# Patient Record
Sex: MALE | Race: OTHER | NOT HISPANIC OR LATINO | URBAN - METROPOLITAN AREA
[De-identification: names, ages, dates, MRNs, and addresses within clinical notes are randomized per-mention and may not be internally consistent; named-entity substitution may affect disease eponyms.]

---

## 2024-11-05 ENCOUNTER — HOSPITAL ENCOUNTER (OUTPATIENT)
Dept: RADIOLOGY | Facility: HOSPITAL | Age: 29
Discharge: HOME | End: 2024-11-05
Attending: INTERNAL MEDICINE
Payer: COMMERCIAL

## 2024-11-05 DIAGNOSIS — M25.559 HIP PAIN: ICD-10-CM

## 2024-11-05 PROCEDURE — 73502 X-RAY EXAM HIP UNI 2-3 VIEWS: CPT | Mod: TC,RT

## 2024-11-06 DIAGNOSIS — M25.551 RIGHT HIP PAIN: Primary | ICD-10-CM

## 2024-11-20 ENCOUNTER — OFFICE VISIT (OUTPATIENT)
Dept: ORTHOPEDICS | Facility: CLINIC | Age: 29
End: 2024-11-20
Payer: COMMERCIAL

## 2024-11-20 VITALS
WEIGHT: 198.81 LBS | HEIGHT: 69 IN | DIASTOLIC BLOOD PRESSURE: 86 MMHG | BODY MASS INDEX: 29.45 KG/M2 | HEART RATE: 92 BPM | SYSTOLIC BLOOD PRESSURE: 138 MMHG

## 2024-11-20 DIAGNOSIS — M25.551 RIGHT HIP PAIN: ICD-10-CM

## 2024-11-20 DIAGNOSIS — M16.11 PRIMARY OSTEOARTHRITIS OF RIGHT HIP: Primary | ICD-10-CM

## 2024-11-20 DIAGNOSIS — M25.551 RIGHT HIP PAIN: Primary | ICD-10-CM

## 2024-11-20 DIAGNOSIS — M45.9 ANKYLOSING SPONDYLITIS, UNSPECIFIED SITE OF SPINE: ICD-10-CM

## 2024-11-20 PROCEDURE — 3075F SYST BP GE 130 - 139MM HG: CPT | Mod: CPTII,,, | Performed by: PHYSICIAN ASSISTANT

## 2024-11-20 PROCEDURE — 3079F DIAST BP 80-89 MM HG: CPT | Mod: CPTII,,, | Performed by: PHYSICIAN ASSISTANT

## 2024-11-20 PROCEDURE — 4010F ACE/ARB THERAPY RXD/TAKEN: CPT | Mod: CPTII,,, | Performed by: PHYSICIAN ASSISTANT

## 2024-11-20 PROCEDURE — 1160F RVW MEDS BY RX/DR IN RCRD: CPT | Mod: CPTII,,, | Performed by: PHYSICIAN ASSISTANT

## 2024-11-20 PROCEDURE — 99203 OFFICE O/P NEW LOW 30 MIN: CPT | Mod: ,,, | Performed by: PHYSICIAN ASSISTANT

## 2024-11-20 PROCEDURE — 1159F MED LIST DOCD IN RCRD: CPT | Mod: CPTII,,, | Performed by: PHYSICIAN ASSISTANT

## 2024-11-20 PROCEDURE — 3008F BODY MASS INDEX DOCD: CPT | Mod: CPTII,,, | Performed by: PHYSICIAN ASSISTANT

## 2024-11-20 RX ORDER — METHOCARBAMOL 750 MG/1
500 TABLET, FILM COATED ORAL 4 TIMES DAILY
COMMUNITY

## 2024-11-20 RX ORDER — CELECOXIB 200 MG/1
CAPSULE ORAL
COMMUNITY
Start: 2024-10-15

## 2024-11-20 NOTE — PROGRESS NOTES
"Chief Complaint:   Chief Complaint   Patient presents with    Hip Pain     R hip pain ongoing for about 6 mths. States is has gradually been getting worse. At times pain will radiate in the groin lower back and down the leg.        History of present illness:    This is a 29 y.o. year old male who complains of right hip and groin pain.    Patient states he has been having pain for about 6 months but it was gradually been getting worse   He states that he when he was back in [facility] of the-he was diagnosed with ankylosing spondylitis in 2010 he believes.    The medications he has been using recently have not been helping with his symptoms in his here for increasing right hip pain.          Current Outpatient Medications   Medication Sig    celecoxib (CELEBREX) 200 MG capsule     methocarbamoL (ROBAXIN) 750 MG Tab Take 500 mg by mouth 4 (four) times daily. (Patient not taking: Reported on 11/20/2024)     No current facility-administered medications for this visit.       Review of Systems:    Constitution:   Denies chills, fever, and sweats.  HENT:   Denies headaches or blurry vision.  Cardiovascular:  Denies chest pain or irregular heart beat.  Respiratory:   Denies cough or shortness of breath.  Gastrointestinal:  Denies abdominal pain, nausea, or vomiting.  Musculoskeletal:   Denies muscle cramps.  Neurological:   Denies dizziness or focal weakness.  Psychiatric/Behavior: Normal mental status.  Hematology/Lymph:  Denies bleeding problem or easy bruising/bleeding.  Skin:    Denies rash or suspicious lesions.    Examination:    Vital Signs:    Vitals:    11/20/24 0947   BP: 138/86   Pulse: 92   Weight: 90.2 kg (198 lb 12.8 oz)   Height: 5' 9" (1.753 m)       Body mass index is 29.36 kg/m².    Constitution:   Well-developed, well nourished patient in no acute distress.  Neurological:   Alert and oriented x 3 and cooperative to examination.     Psychiatric/Behavior: Normal mental status.  Respiratory:   No shortness of " breath.  Eyes:    Extraoccular muscles intact  Skin:    No scars, rash or suspicious lesions.    Physical Exam:   Hip Exam:  Right  No obvious deformity.   Flexion to 100 degrees and internal and external rotation to 10 degrees.   Abduction to 15 degree and adduction to 10 degrees.   Positive NELSON test.   Positive Stinchfield test.   No flexion contracture.   Negative  greater trochanteric tenderness.   Negative ANNETTA test.   2/5 strength, normal skin appearance and palpable pulses distally. Sensibility normal.    Imaging: X-rays reviewed from the PCP's office  INDINGS:  No acute displaced fractures or dislocations.     There is narrowing of the inferior medial and superolateral aspect of both hip joints with degenerative changes of the sacroiliac joints and lumbosacral spine articular spaces are otherwise preserved with smooth articular surfaces     No blastic or lytic lesions.     Soft tissues within normal limits.     Impression:     Degenerative changes.       Assessment: Primary osteoarthritis of right hip    Right hip pain  -     Ambulatory referral/consult to Orthopedics    Ankylosing spondylitis, unspecified site of spine         Plan:  The patient does have some significant degenerative changes of his right hip and limitations of range of motion.    Also reviewed his lumbar x-rays which he does have ankylosing spondylitis and his symptoms do appear to be coming more from his hip in his back presently.    We will have him see 1 of our total joint specialist to discuss if injection would be an option for him to buy us some time but he will more than likely need a total hip arthroplasty some point in the future pain   Patient was follow up with Dr. Emily medrano for more definitive treatment options          DISCLAIMER: This note may have been dictated using voice recognition software and may contain grammatical errors.     NOTE: Consult report sent to referring provider via [employer].

## 2024-12-19 ENCOUNTER — OFFICE VISIT (OUTPATIENT)
Dept: ORTHOPEDICS | Facility: CLINIC | Age: 29
End: 2024-12-19
Payer: COMMERCIAL

## 2024-12-19 ENCOUNTER — LAB VISIT (OUTPATIENT)
Dept: LAB | Facility: HOSPITAL | Age: 29
End: 2024-12-19
Attending: ORTHOPAEDIC SURGERY
Payer: COMMERCIAL

## 2024-12-19 VITALS
DIASTOLIC BLOOD PRESSURE: 104 MMHG | HEART RATE: 101 BPM | SYSTOLIC BLOOD PRESSURE: 146 MMHG | BODY MASS INDEX: 28.73 KG/M2 | WEIGHT: 194 LBS | HEIGHT: 69 IN

## 2024-12-19 DIAGNOSIS — M45.9 ANKYLOSING SPONDYLITIS, UNSPECIFIED SITE OF SPINE: Primary | ICD-10-CM

## 2024-12-19 DIAGNOSIS — M45.9 ANKYLOSING SPONDYLITIS, UNSPECIFIED SITE OF SPINE: ICD-10-CM

## 2024-12-19 DIAGNOSIS — M16.11 PRIMARY OSTEOARTHRITIS OF RIGHT HIP: ICD-10-CM

## 2024-12-19 LAB
BASOPHILS # BLD AUTO: 0.03 X10(3)/MCL
BASOPHILS NFR BLD AUTO: 0.4 %
CRP SERPL-MCNC: 18.8 MG/L
EOSINOPHIL # BLD AUTO: 0.09 X10(3)/MCL (ref 0–0.9)
EOSINOPHIL NFR BLD AUTO: 1.2 %
ERYTHROCYTE [DISTWIDTH] IN BLOOD BY AUTOMATED COUNT: 12.8 % (ref 11.5–17)
ERYTHROCYTE [SEDIMENTATION RATE] IN BLOOD: 27 MM/HR (ref 0–15)
HCT VFR BLD AUTO: 46.7 % (ref 42–52)
HGB BLD-MCNC: 15.6 G/DL (ref 14–18)
IMM GRANULOCYTES # BLD AUTO: 0.02 X10(3)/MCL (ref 0–0.04)
IMM GRANULOCYTES NFR BLD AUTO: 0.3 %
LYMPHOCYTES # BLD AUTO: 2.06 X10(3)/MCL (ref 0.6–4.6)
LYMPHOCYTES NFR BLD AUTO: 28.3 %
MCH RBC QN AUTO: 27.3 PG (ref 27–31)
MCHC RBC AUTO-ENTMCNC: 33.4 G/DL (ref 33–36)
MCV RBC AUTO: 81.6 FL (ref 80–94)
MONOCYTES # BLD AUTO: 0.44 X10(3)/MCL (ref 0.1–1.3)
MONOCYTES NFR BLD AUTO: 6 %
NEUTROPHILS # BLD AUTO: 4.65 X10(3)/MCL (ref 2.1–9.2)
NEUTROPHILS NFR BLD AUTO: 63.8 %
NRBC BLD AUTO-RTO: 0 %
PLATELET # BLD AUTO: 252 X10(3)/MCL (ref 130–400)
PMV BLD AUTO: 10.9 FL (ref 7.4–10.4)
RBC # BLD AUTO: 5.72 X10(6)/MCL (ref 4.7–6.1)
RHEUMATOID FACT SERPL-ACNC: <13 IU/ML
WBC # BLD AUTO: 7.29 X10(3)/MCL (ref 4.5–11.5)

## 2024-12-19 PROCEDURE — 86140 C-REACTIVE PROTEIN: CPT

## 2024-12-19 PROCEDURE — 86225 DNA ANTIBODY NATIVE: CPT

## 2024-12-19 PROCEDURE — 85652 RBC SED RATE AUTOMATED: CPT

## 2024-12-19 PROCEDURE — 86812 HLA TYPING A B OR C: CPT

## 2024-12-19 PROCEDURE — 85025 COMPLETE CBC W/AUTO DIFF WBC: CPT

## 2024-12-19 PROCEDURE — 86200 CCP ANTIBODY: CPT

## 2024-12-19 PROCEDURE — 36415 COLL VENOUS BLD VENIPUNCTURE: CPT

## 2024-12-19 PROCEDURE — 86431 RHEUMATOID FACTOR QUANT: CPT

## 2024-12-19 RX ORDER — SODIUM CHLORIDE 9 MG/ML
INJECTION, SOLUTION INTRAVENOUS CONTINUOUS
OUTPATIENT
Start: 2024-12-19

## 2024-12-19 NOTE — H&P (VIEW-ONLY)
Chief Complaint:   Chief Complaint   Patient presents with    Right Hip - Pain     Right hip pain also his back radiating to glutens and down to knee.ongoing about 7-8 months he had this beofre but it got better and its not going away. He has trouble getting up from sitting and getting out of a car. He is taking celebrex to help with pain at night. Stiffness and has a limp the pain changes.  Had a fall from a elevator and he landed on a brick about about 3 or 4 feet high and his leg was twisted.       History of present illness:    History of Present Illness  The patient is a 29-year-old male who presents for evaluation of right hip pain.    He has been experiencing intermittent radiating pain in his right hip for the past 8 months, with occasional discomfort extending to his back, upper thigh, and groin area. The pain is described as more of a stiffness. He reports no cardiac or pulmonary issues but acknowledges mild pain in his right leg. He also mentions a previous diagnosis of ankylosing spondylitis in 2010, which rendered him unable to work for over a year. Despite not receiving formal treatment, he managed to recover through gym exercises. However, he still experiences intermittent pain episodes that typically resolve within 3 to 4 days. An episode of pain that started 8 months ago has persisted. He expresses concern about potential weight gain from injections and the risk of heart attack and stroke associated with Celebrex. He prefers to avoid medication and is considering injections as an alternative. He has been managing the pain with nightly doses of Celebrex for the last 2 months. He has not received any injections to date. A previous referral to physical therapy resulted in a 3-week course of treatment, which initially provided relief but was discontinued due to a subsequent increase in pain. He has an upcoming appointment with a rheumatologist on 04/08/2025.    MEDICATIONS  Celecoxib    No past  medical history on file.    No past surgical history on file.    Current Outpatient Medications   Medication Sig    celecoxib (CELEBREX) 200 MG capsule     methocarbamoL (ROBAXIN) 750 MG Tab Take 500 mg by mouth 4 (four) times daily. (Patient not taking: Reported on 11/20/2024)     No current facility-administered medications for this visit.       Review of patient's allergies indicates:  No Known Allergies    Family History   Problem Relation Name Age of Onset    Diabetes Mother      Hypertension Mother      Hypertension Father      Diabetes Father         Social History     Socioeconomic History    Marital status: Unknown   Tobacco Use    Smoking status: Some Days     Types: Cigarettes, Vaping with nicotine    Smokeless tobacco: Never           Review of Systems:    Constitution: Negative for chills, fever, and sweats.  Negative for unexplained weight loss.    HENT:  Negative for headaches and blurry vision.    Cardiovascular:Negative for chest pain or irregular heart beat. Negative for hypertension.    Respiratory:  Negative for cough and shortness of breath.    Gastrointestinal: Negative for abdominal pain, heartburn, melena, nausea, and vomitting.    Genitourinary:  Negative bladder incontinence and dysuria.    Musculoskeletal:  See HPI    Neurological: Negative for numbness.    Psychiatric/Behavioral: Negative for depression.  The patient is not nervous/anxious.      Endocrine: Negative for polyuria    Hematologic/Lymphatic: Negative for bleeding problem.  Does not bruise/bleed easily.    Skin: Negative for poor would healing and rash      Physical Examination:    Vital Signs:    Vitals:    12/19/24 1412   BP: (!) 146/104   Pulse: 101       Body mass index is 28.65 kg/m².    General: No acute distress, alert and oriented, healthy appearing    HEENT: Head is atraumatic, mucous membranes are moist    Neck: Supples, no JVD    Cardiovascular: Palpable dorsalis pedis and posterior tibial pulses, regular rate and  rhythm to those pulses    Lungs: Breathing non-labored    Skin: no rashes appreciated    Neurologic: Can flex and extend knees, ankles, and toes. Sensation is grossly intact    Right hip:  Range of motion of the right hip significant groin pain.  Brisk cap refill disappeared sensation intact distally.    X-rays:  Three views right hip end-stage osteoarthritis bone-on-bone articulation of the hip.  Kellgren Evert 4 changes right hip.     Assessment::  Right hip osteoarthritis    Plan:  Discussed all treatment with the patient.  Patient with end-stage osteoarthritis of the right hip.  He would likely having ankylosing spondylitis.  He would like to work him up for rheumatologic issues today.  Does have rheumatoid appointment coming up.  We will try an injection to calm this down temporarily well they work him up for rheumatologic issues.  Heading to towards hip replacement on the right side.    This note was generated with the assistance of ambient listening technology. Verbal consent was obtained by the patient and accompanying visitor(s) for the recording of patient appointment to facilitate this note. I attest to having reviewed and edited the generated note for accuracy, though some syntax or spelling errors may persist. Please contact the author of this note for any clarification.      This note was created using [employer] voice recognition software that occasionally misinterpreted phrases or words.    Consult note is delivered via Epic messaging service.

## 2024-12-19 NOTE — PROGRESS NOTES
Chief Complaint:   Chief Complaint   Patient presents with    Right Hip - Pain     Right hip pain also his back radiating to glutens and down to knee.ongoing about 7-8 months he had this beofre but it got better and its not going away. He has trouble getting up from sitting and getting out of a car. He is taking celebrex to help with pain at night. Stiffness and has a limp the pain changes.  Had a fall from a elevator and he landed on a brick about about 3 or 4 feet high and his leg was twisted.       History of present illness:    History of Present Illness  The patient is a 29-year-old male who presents for evaluation of right hip pain.    He has been experiencing intermittent radiating pain in his right hip for the past 8 months, with occasional discomfort extending to his back, upper thigh, and groin area. The pain is described as more of a stiffness. He reports no cardiac or pulmonary issues but acknowledges mild pain in his right leg. He also mentions a previous diagnosis of ankylosing spondylitis in 2010, which rendered him unable to work for over a year. Despite not receiving formal treatment, he managed to recover through gym exercises. However, he still experiences intermittent pain episodes that typically resolve within 3 to 4 days. An episode of pain that started 8 months ago has persisted. He expresses concern about potential weight gain from injections and the risk of heart attack and stroke associated with Celebrex. He prefers to avoid medication and is considering injections as an alternative. He has been managing the pain with nightly doses of Celebrex for the last 2 months. He has not received any injections to date. A previous referral to physical therapy resulted in a 3-week course of treatment, which initially provided relief but was discontinued due to a subsequent increase in pain. He has an upcoming appointment with a rheumatologist on 04/08/2025.    MEDICATIONS  Celecoxib    No past  medical history on file.    No past surgical history on file.    Current Outpatient Medications   Medication Sig    celecoxib (CELEBREX) 200 MG capsule     methocarbamoL (ROBAXIN) 750 MG Tab Take 500 mg by mouth 4 (four) times daily. (Patient not taking: Reported on 11/20/2024)     No current facility-administered medications for this visit.       Review of patient's allergies indicates:  No Known Allergies    Family History   Problem Relation Name Age of Onset    Diabetes Mother      Hypertension Mother      Hypertension Father      Diabetes Father         Social History     Socioeconomic History    Marital status: Unknown   Tobacco Use    Smoking status: Some Days     Types: Cigarettes, Vaping with nicotine    Smokeless tobacco: Never           Review of Systems:    Constitution: Negative for chills, fever, and sweats.  Negative for unexplained weight loss.    HENT:  Negative for headaches and blurry vision.    Cardiovascular:Negative for chest pain or irregular heart beat. Negative for hypertension.    Respiratory:  Negative for cough and shortness of breath.    Gastrointestinal: Negative for abdominal pain, heartburn, melena, nausea, and vomitting.    Genitourinary:  Negative bladder incontinence and dysuria.    Musculoskeletal:  See HPI    Neurological: Negative for numbness.    Psychiatric/Behavioral: Negative for depression.  The patient is not nervous/anxious.      Endocrine: Negative for polyuria    Hematologic/Lymphatic: Negative for bleeding problem.  Does not bruise/bleed easily.    Skin: Negative for poor would healing and rash      Physical Examination:    Vital Signs:    Vitals:    12/19/24 1412   BP: (!) 146/104   Pulse: 101       Body mass index is 28.65 kg/m².    General: No acute distress, alert and oriented, healthy appearing    HEENT: Head is atraumatic, mucous membranes are moist    Neck: Supples, no JVD    Cardiovascular: Palpable dorsalis pedis and posterior tibial pulses, regular rate and  rhythm to those pulses    Lungs: Breathing non-labored    Skin: no rashes appreciated    Neurologic: Can flex and extend knees, ankles, and toes. Sensation is grossly intact    Right hip:  Range of motion of the right hip significant groin pain.  Brisk cap refill disappeared sensation intact distally.    X-rays:  Three views right hip end-stage osteoarthritis bone-on-bone articulation of the hip.  Kellgren Evert 4 changes right hip.     Assessment::  Right hip osteoarthritis    Plan:  Discussed all treatment with the patient.  Patient with end-stage osteoarthritis of the right hip.  He would likely having ankylosing spondylitis.  He would like to work him up for rheumatologic issues today.  Does have rheumatoid appointment coming up.  We will try an injection to calm this down temporarily well they work him up for rheumatologic issues.  Heading to towards hip replacement on the right side.    This note was generated with the assistance of ambient listening technology. Verbal consent was obtained by the patient and accompanying visitor(s) for the recording of patient appointment to facilitate this note. I attest to having reviewed and edited the generated note for accuracy, though some syntax or spelling errors may persist. Please contact the author of this note for any clarification.      This note was created using [employer] voice recognition software that occasionally misinterpreted phrases or words.    Consult note is delivered via Epic messaging service.

## 2024-12-20 LAB
ANTINUCLEAR ANTIBODY SCREEN (OHS): NEGATIVE
CYCLIC CITRULLINATED PEPTIDE (CCP) (OHS): 2.1 U/ML
DSDNA AB QUANT (OHS): 0.8 IU/ML

## 2024-12-21 LAB
HLA TYP COMM-IMP: NORMAL
HLA-B27 QL FC: POSITIVE

## 2025-01-07 RX ORDER — GLUCAGON 1 MG
1 KIT INJECTION
OUTPATIENT
Start: 2025-01-07

## 2025-01-07 RX ORDER — ONDANSETRON HYDROCHLORIDE 2 MG/ML
4 INJECTION, SOLUTION INTRAVENOUS ONCE AS NEEDED
OUTPATIENT
Start: 2025-01-07 | End: 2036-06-05

## 2025-01-08 ENCOUNTER — HOSPITAL ENCOUNTER (OUTPATIENT)
Facility: HOSPITAL | Age: 30
Discharge: HOME | End: 2025-01-08
Attending: ORTHOPAEDIC SURGERY | Admitting: ORTHOPAEDIC SURGERY
Payer: COMMERCIAL

## 2025-01-08 ENCOUNTER — TELEPHONE (OUTPATIENT)
Dept: SURGERY | Facility: HOSPITAL | Age: 30
End: 2025-01-08
Payer: COMMERCIAL

## 2025-01-08 DIAGNOSIS — M16.11 PRIMARY OSTEOARTHRITIS OF RIGHT HIP: ICD-10-CM

## 2025-01-08 PROCEDURE — 77002 NEEDLE LOCALIZATION BY XRAY: CPT | Mod: 26,,, | Performed by: ORTHOPAEDIC SURGERY

## 2025-01-08 PROCEDURE — 20610 DRAIN/INJ JOINT/BURSA W/O US: CPT | Performed by: ORTHOPAEDIC SURGERY

## 2025-01-08 PROCEDURE — 20610 DRAIN/INJ JOINT/BURSA W/O US: CPT | Mod: RT,,, | Performed by: ORTHOPAEDIC SURGERY

## 2025-01-08 PROCEDURE — 63600175 PHARM REV CODE 636 W HCPCS: Performed by: ORTHOPAEDIC SURGERY

## 2025-01-08 RX ORDER — BETAMETHASONE SODIUM PHOSPHATE AND BETAMETHASONE ACETATE 3; 3 MG/ML; MG/ML
INJECTION, SUSPENSION INTRA-ARTICULAR; INTRALESIONAL; INTRAMUSCULAR; SOFT TISSUE
Status: DISCONTINUED | OUTPATIENT
Start: 2025-01-08 | End: 2025-01-08 | Stop reason: HOSPADM

## 2025-01-08 RX ORDER — MELOXICAM 15 MG/1
15 TABLET ORAL DAILY
Qty: 30 TABLET | Refills: 2 | Status: SHIPPED | OUTPATIENT
Start: 2025-01-08 | End: 2025-01-08 | Stop reason: SDUPTHER

## 2025-01-08 RX ORDER — SODIUM CHLORIDE 9 MG/ML
INJECTION, SOLUTION INTRAVENOUS CONTINUOUS
Status: DISCONTINUED | OUTPATIENT
Start: 2025-01-08 | End: 2025-01-08 | Stop reason: HOSPADM

## 2025-01-08 RX ORDER — LIDOCAINE HYDROCHLORIDE 10 MG/ML
INJECTION, SOLUTION INFILTRATION; PERINEURAL
Status: DISCONTINUED
Start: 2025-01-08 | End: 2025-01-08 | Stop reason: HOSPADM

## 2025-01-08 RX ORDER — LIDOCAINE HYDROCHLORIDE 10 MG/ML
1 INJECTION, SOLUTION EPIDURAL; INFILTRATION; INTRACAUDAL; PERINEURAL ONCE
Status: DISCONTINUED | OUTPATIENT
Start: 2025-01-08 | End: 2025-01-08 | Stop reason: HOSPADM

## 2025-01-08 RX ORDER — BETAMETHASONE SODIUM PHOSPHATE AND BETAMETHASONE ACETATE 3; 3 MG/ML; MG/ML
INJECTION, SUSPENSION INTRA-ARTICULAR; INTRALESIONAL; INTRAMUSCULAR; SOFT TISSUE
Status: DISCONTINUED
Start: 2025-01-08 | End: 2025-01-08 | Stop reason: HOSPADM

## 2025-01-08 RX ORDER — LIDOCAINE HYDROCHLORIDE 10 MG/ML
INJECTION, SOLUTION INFILTRATION; PERINEURAL
Status: DISCONTINUED | OUTPATIENT
Start: 2025-01-08 | End: 2025-01-08 | Stop reason: HOSPADM

## 2025-01-08 RX ORDER — HYDROCODONE BITARTRATE AND ACETAMINOPHEN 5; 325 MG/1; MG/1
1 TABLET ORAL EVERY 6 HOURS PRN
COMMUNITY

## 2025-01-08 RX ORDER — MELOXICAM 15 MG/1
15 TABLET ORAL DAILY
Qty: 30 TABLET | Refills: 2 | Status: SHIPPED | OUTPATIENT
Start: 2025-01-08

## 2025-01-08 NOTE — DISCHARGE SUMMARY
[facility] - Periop Services  Discharge Note  Short Stay    Procedure(s) (LRB):  Injection, Joint, Hip (Right)      OUTCOME: Patient tolerated treatment/procedure well without complication and is now ready for discharge.    DISPOSITION: Home or Self Care    FINAL DIAGNOSIS:  Primary osteoarthritis of right hip    FOLLOWUP: In clinic    DISCHARGE INSTRUCTIONS:  No discharge procedures on file.     TIME SPENT ON DISCHARGE: 5 minutes

## 2025-01-08 NOTE — TELEPHONE ENCOUNTER
Ting with post op sent an in basket stating that pt was requesting mobic be sent to [facility] on [address].

## 2025-01-08 NOTE — OP NOTE
Date of Procedure: 1/8/2025    Procedure: R ACETABULOFEMORAL JOINT INJECTION (HIP)/arthrogram    Provider: Álvaro Mathias MD    Pre-Operative Diagnosis: Primary osteoarthritis of R hip     Post-Operative Diagnosis: as above    Anesthesia: Local    Description of the Findings of the Procedure:     The patient was brought to the procedure room.  IThe patient was positioned on the fluoroscopy table.  The skin overlying the hip was prepped and draped in a sterile fashion.  The skin and subcutaneous tissue was anesthetized using 1-2 cc of lidocaine 2%.  An 18 gauge, spinal needle was slowly advanced through under fluoroscopic guidance into the acetabulofemoral joint. The needle position was confirmed using oblique, AP and lateral fluoroscopic imaging.  2 cc of Omnipaque 300 was injected confirming intra-articular contrast spread. A combination of 12 mg betamethasone and 2 cc 2% lidocaine was easily injected. The needle was removed and band-aid placed.  A sterile dressing was applied. No specimens collected. Raju was taken to the Post-block Recovery Area for further observation. And discharged home when appropriate.    Complications: None    Estimated Blood Loss (EBL): Minimal           Specimens: none           Condition: Good    Disposition: PACU - hemodynamically stable.      Fluoroscopic guidance was used for needle localization.  Images were saved and stored for documentation.  The hip structures were identified and visualized.  Dynamic visualization of the 18g x 3.5 cm needle was continuous throughout the procedure and maintained in good position.

## 2025-01-09 VITALS
OXYGEN SATURATION: 100 % | RESPIRATION RATE: 18 BRPM | DIASTOLIC BLOOD PRESSURE: 90 MMHG | SYSTOLIC BLOOD PRESSURE: 149 MMHG | HEIGHT: 69 IN | BODY MASS INDEX: 28.88 KG/M2 | HEART RATE: 79 BPM | WEIGHT: 195 LBS | TEMPERATURE: 96 F

## 2025-01-24 ENCOUNTER — TELEPHONE (OUTPATIENT)
Dept: SURGERY | Facility: HOSPITAL | Age: 30
End: 2025-01-24
Payer: COMMERCIAL

## 2025-01-25 NOTE — TELEPHONE ENCOUNTER
Contacted patient regarding what is stated below by Alice.    Moved appointment up, see appt desk. Patient voiced a clear understanding.

## 2025-01-27 ENCOUNTER — TELEPHONE (OUTPATIENT)
Dept: ORTHOPEDICS | Facility: CLINIC | Age: 30
End: 2025-01-27
Payer: COMMERCIAL

## 2025-01-27 NOTE — TELEPHONE ENCOUNTER
Patient called asking if he would be able to do his visit on 1/28/25 virtually. Informed patient that due to the nature of the visit he would need to be seen in office. Patient voiced a clear understanding.

## 2025-01-28 ENCOUNTER — OFFICE VISIT (OUTPATIENT)
Dept: ORTHOPEDICS | Facility: CLINIC | Age: 30
End: 2025-01-28
Payer: COMMERCIAL

## 2025-01-28 VITALS
SYSTOLIC BLOOD PRESSURE: 134 MMHG | DIASTOLIC BLOOD PRESSURE: 91 MMHG | BODY MASS INDEX: 28.8 KG/M2 | HEART RATE: 89 BPM | HEIGHT: 69 IN

## 2025-01-28 DIAGNOSIS — M16.11 PRIMARY OSTEOARTHRITIS OF RIGHT HIP: Primary | ICD-10-CM

## 2025-01-28 PROCEDURE — 99214 OFFICE O/P EST MOD 30 MIN: CPT | Mod: ,,, | Performed by: ORTHOPAEDIC SURGERY

## 2025-01-28 PROCEDURE — 3075F SYST BP GE 130 - 139MM HG: CPT | Mod: CPTII,,, | Performed by: ORTHOPAEDIC SURGERY

## 2025-01-28 PROCEDURE — 1159F MED LIST DOCD IN RCRD: CPT | Mod: CPTII,,, | Performed by: ORTHOPAEDIC SURGERY

## 2025-01-28 PROCEDURE — 3008F BODY MASS INDEX DOCD: CPT | Mod: CPTII,,, | Performed by: ORTHOPAEDIC SURGERY

## 2025-01-28 PROCEDURE — 3080F DIAST BP >= 90 MM HG: CPT | Mod: CPTII,,, | Performed by: ORTHOPAEDIC SURGERY

## 2025-01-28 RX ORDER — CELECOXIB 200 MG/1
CAPSULE ORAL 2 TIMES DAILY
COMMUNITY
Start: 2025-01-24

## 2025-01-28 RX ORDER — METHOCARBAMOL 750 MG/1
TABLET, FILM COATED ORAL
COMMUNITY
Start: 2025-01-24

## 2025-01-28 NOTE — PROGRESS NOTES
Chief Complaint:   Chief Complaint   Patient presents with    Right Hip - Follow-up     Rt hip inj on 1/8/25 patient states injection helped only to rest he would like to discuss options for his hip pain. He also went back to taking his prior medication and he is still in pain with both medication. Sitting no pain but walking is worse. He ask if he can get a script for a walker he lives on the 3rd floor and has trouble going up he took a uber to get here today.        History of present illness:    History of Present Illness  The patient is a 29-year-old male who presents for evaluation of right hip pain.    He reports a progressive worsening of his right hip pain, which has been persistent for approximately 9 months. The pain is severe enough to prevent him from attending work this week and significantly impedes his ability to ascend stairs. He also experiences pain in the buttock and thigh regions. He does not experience any pain while seated but reports a sensation of heaviness and stiffness upon attempting to move. 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 He resides on the third floor of a building and is apprehensive about the impact of surgery on his mobility. He has an upcoming appointment with a rheumatologist scheduled for 04/2024. An injection administered previously provided temporary relief for a duration of 2 days. His current medication regimen includes celecoxib twice daily, hydrocodone, and methocarbamol.    MEDICATIONS  Celecoxib, hydrocodone, methocarbamol    Past Medical History:   Diagnosis Date    Arthritis     Hypertension        Past Surgical History:   Procedure Laterality Date    INJECTION OF JOINT Right 1/8/2025    Procedure: Injection, Joint, Hip;  Surgeon: Álvaro Mathias MD;  Location: [facility];  Service: Orthopedics;  Laterality: Right;  no anesthesia       Current  Outpatient Medications   Medication Sig    celecoxib (CELEBREX) 200 MG capsule Take by mouth 2 (two) times daily.    methocarbamoL (ROBAXIN) 750 MG Tab Take by mouth.    HYDROcodone-acetaminophen (NORCO) 5-325 mg per tablet Take 1 tablet by mouth every 6 (six) hours as needed for Pain.    meloxicam (MOBIC) 15 MG tablet Take 1 tablet (15 mg total) by mouth once daily.     No current facility-administered medications for this visit.       Review of patient's allergies indicates:  No Known Allergies    Family History   Problem Relation Name Age of Onset    Diabetes Mother      Hypertension Mother      Hypertension Father      Diabetes Father         Social History     Socioeconomic History    Marital status: Unknown   Tobacco Use    Smoking status: Every Day     Types: Vaping with nicotine, Cigarettes    Smokeless tobacco: Never   Substance and Sexual Activity    Alcohol use: Yes    Drug use: Not Currently    Sexual activity: Yes           Review of Systems:    Constitution: Negative for chills, fever, and sweats.  Negative for unexplained weight loss.    HENT:  Negative for headaches and blurry vision.    Cardiovascular:Negative for chest pain or irregular heart beat. Negative for hypertension.    Respiratory:  Negative for cough and shortness of breath.    Gastrointestinal: Negative for abdominal pain, heartburn, melena, nausea, and vomitting.    Genitourinary:  Negative bladder incontinence and dysuria.    Musculoskeletal:  See HPI    Neurological: Negative for numbness.    Psychiatric/Behavioral: Negative for depression.  The patient is not nervous/anxious.      Endocrine: Negative for polyuria    Hematologic/Lymphatic: Negative for bleeding problem.  Does not bruise/bleed easily.    Skin: Negative for poor would healing and rash      Physical Examination:    Vital Signs:    Vitals:    01/28/25 1038   BP: (!) 134/91   Pulse: 89       Body mass index is 28.8 kg/m².    General: No acute distress, alert and oriented,  healthy appearing    HEENT: Head is atraumatic, mucous membranes are moist    Neck: Supples, no JVD    Cardiovascular: Palpable dorsalis pedis and posterior tibial pulses, regular rate and rhythm to those pulses    Lungs: Breathing non-labored    Skin: no rashes appreciated    Neurologic: Can flex and extend knees, ankles, and toes. Sensation is grossly intact    Right hip:  The patient's right hip with a really no range of motion.  It is near fused at this point in time.  Brisk capillary refill distally.  Pain in his buttocks and posterior leg with the any range of motion.    X-rays:  Three views right hip reviewed with the patient with end-stage osteoarthritis.  It has with loss of joint space and bone-on-bone articulation of the right hip.  Kellgren Evert grade 4 changes     Assessment::  Right hip osteoarthritis    Plan:  At this point the patient is tried and failed all conservative management with regards to their hip. They have tried and failed nonoperative management including: Anti-inflammatories, activity modification, the use of a cane. All of these have failed to completely remove their pain. They have pain going up and down stairs as well as walking on level ground. The hip pain is affecting activities of daily living They feel that theyve reached a point of disability with regards to their hip. X-rays reveal advanced, end-stage degenerative osteoarthritis as noted by subchondral sclerosis, joint space narrowing, and periarticular osteophytes. The patient would like to proceed with surgical intervention and would be a good candidate for total hip replacement.    Total hip arthroplasty procedure, alternatives, risks, and benefits were discussed in detail. The risks including but not limited to: infection, need for revision surgery, pain, swelling, loosening, injury to surrounding neurovascular structures, stiffness, incomplete resolution of pain, DVT, PE, and death were discussed in detail. Despite  these risks, the patient would like to proceed with surgical intervention. All questions were answered, no guarantees made. Will plan for right AQUILES on March.      Given the patient's ankylosing spondylitis, as well as preoperative immobility, expect to be a slow recovery.  Plan to admit him overnight for aggressive physical therapy.    This note was generated with the assistance of ambient listening technology. Verbal consent was obtained by the patient and accompanying visitor(s) for the recording of patient appointment to facilitate this note. I attest to having reviewed and edited the generated note for accuracy, though some syntax or spelling errors may persist. Please contact the author of this note for any clarification.      This note was created using "[employer] voice recognition software that occasionally misinterpreted phrases or words.    Consult note is delivered via Epic messaging service.

## 2025-02-25 ENCOUNTER — HOSPITAL ENCOUNTER (OUTPATIENT)
Dept: RADIOLOGY | Facility: HOSPITAL | Age: 30
Discharge: HOME | End: 2025-02-25
Attending: NURSE PRACTITIONER
Payer: COMMERCIAL

## 2025-02-25 ENCOUNTER — OFFICE VISIT (OUTPATIENT)
Dept: ORTHOPEDICS | Facility: CLINIC | Age: 30
End: 2025-02-25
Payer: COMMERCIAL

## 2025-02-25 VITALS
HEIGHT: 69 IN | BODY MASS INDEX: 29.65 KG/M2 | HEART RATE: 94 BPM | WEIGHT: 200.19 LBS | SYSTOLIC BLOOD PRESSURE: 132 MMHG | DIASTOLIC BLOOD PRESSURE: 82 MMHG

## 2025-02-25 DIAGNOSIS — M16.11 PRIMARY OSTEOARTHRITIS OF RIGHT HIP: ICD-10-CM

## 2025-02-25 DIAGNOSIS — Z01.818 PREOPERATIVE TESTING: ICD-10-CM

## 2025-02-25 DIAGNOSIS — M16.11 PRIMARY OSTEOARTHRITIS OF RIGHT HIP: Primary | ICD-10-CM

## 2025-02-25 DIAGNOSIS — M19.90 OSTEOARTHRITIS: ICD-10-CM

## 2025-02-25 DIAGNOSIS — R79.9 ABNORMAL BLOOD CHEMISTRY LEVEL: ICD-10-CM

## 2025-02-25 LAB
MRSA PCR SCRN (OHS): NOT DETECTED
OHS QRS DURATION: 92 MS
OHS QTC CALCULATION: 411 MS

## 2025-02-25 PROCEDURE — 71046 X-RAY EXAM CHEST 2 VIEWS: CPT | Mod: TC

## 2025-02-25 PROCEDURE — 73700 CT LOWER EXTREMITY W/O DYE: CPT | Mod: TC,RT

## 2025-02-25 RX ORDER — SODIUM CHLORIDE, SODIUM GLUCONATE, SODIUM ACETATE, POTASSIUM CHLORIDE AND MAGNESIUM CHLORIDE 30; 37; 368; 526; 502 MG/100ML; MG/100ML; MG/100ML; MG/100ML; MG/100ML
INJECTION, SOLUTION INTRAVENOUS CONTINUOUS
OUTPATIENT
Start: 2025-02-25

## 2025-02-25 RX ORDER — GABAPENTIN 100 MG/1
300 CAPSULE ORAL
OUTPATIENT
Start: 2025-02-25

## 2025-02-25 RX ORDER — KETOROLAC TROMETHAMINE 10 MG/1
10 TABLET, FILM COATED ORAL
OUTPATIENT
Start: 2025-02-25 | End: 2025-02-25

## 2025-02-25 RX ORDER — TAMSULOSIN HYDROCHLORIDE 0.4 MG/1
0.4 CAPSULE ORAL
OUTPATIENT
Start: 2025-02-25

## 2025-02-25 RX ORDER — ACETAMINOPHEN 500 MG
500 TABLET ORAL EVERY 6 HOURS PRN
Status: ON HOLD | COMMUNITY
End: 2025-03-13 | Stop reason: HOSPADM

## 2025-02-25 RX ORDER — ONDANSETRON 4 MG/1
4 TABLET, ORALLY DISINTEGRATING ORAL
OUTPATIENT
Start: 2025-02-25

## 2025-02-25 RX ORDER — ACETAMINOPHEN 500 MG
1000 TABLET ORAL
OUTPATIENT
Start: 2025-02-25

## 2025-02-25 RX ORDER — TRANEXAMIC ACID 650 MG/1
1950 TABLET ORAL
OUTPATIENT
Start: 2025-02-25 | End: 2025-02-25

## 2025-02-25 RX ORDER — SCOPOLAMINE 1 MG/3D
1 PATCH, EXTENDED RELEASE TRANSDERMAL ONCE AS NEEDED
OUTPATIENT
Start: 2025-02-25 | End: 2036-07-24

## 2025-02-25 NOTE — PROGRESS NOTES
Chief Complaint:   Chief Complaint   Patient presents with    Pre-op Exam     Pre-op RT AQUILES Rasheed sx 3/12/25 GL 4/26/25 - PT presents with a walker, not using daily just as needed. Pt states pain is little better, but still bothering him. He is able to walk around and do most indoor activities. Pain will come and go with stairs or long walks. Denies swelling. PT taking 2-3 NORCOs a week for pain as needed.        History of present illness: Wendy Matos is a 29 y.o. male, presents to clinic today in regards to the right hip.  Patient does have a well known history of osteoarthritis to this hip.  His pain is located more in the anterior groin.  This has worse with getting up from a low seated position, putting on socks and shoes and ambulating.  Currently ambulates with the assistance of a walker.  Does have a hard time leaving his apartment due to the amount of stairs.  His pain is continuous.  He has tried over-the-counter anti-inflammatories, lifestyle modifications and a previous injection.  These have failed to relieve his symptoms.  He feels as though he has reached a point of disability we would like to proceed with a right total hip arthroplasty.    Past Medical History:   Diagnosis Date    Arthritis     Hypertension        Past Surgical History:   Procedure Laterality Date    INJECTION OF JOINT Right 1/8/2025    Procedure: Injection, Joint, Hip;  Surgeon: Álvaro Mathias MD;  Location: [facility];  Service: Orthopedics;  Laterality: Right;  no anesthesia       Current Outpatient Medications   Medication Sig    celecoxib (CELEBREX) 200 MG capsule Take by mouth 2 (two) times daily.    HYDROcodone-acetaminophen (NORCO) 5-325 mg per tablet Take 1 tablet by mouth every 6 (six) hours as needed for Pain.    acetaminophen (TYLENOL) 500 MG tablet Take 500 mg by mouth every 6 (six) hours as needed for Pain.    meloxicam (MOBIC) 15 MG tablet Take 1 tablet (15 mg total) by mouth once daily. (Patient not taking: Reported on  2/25/2025)    methocarbamoL (ROBAXIN) 750 MG Tab Take by mouth. (Patient not taking: Reported on 2/25/2025)     No current facility-administered medications for this visit.       Review of patient's allergies indicates:  No Known Allergies    Family History   Problem Relation Name Age of Onset    Diabetes Mother      Hypertension Mother      Hypertension Father      Diabetes Father         Social History[1]        Review of Systems:    Denies fevers, chills, chest pain, shortness of breath. Comprehensive review of systems performed and otherwise negative except as noted in HPI     Physical Examination:    General: awake and alert, no acute distress, healthy appearing  Head and Neck: Head atraumatic/normocephalic. Moist MM  CV: brisk cap refill  Lungs: non-labored breathing, w/o cough or SOB  Skin: no rashes present, warm to touch  Neuro: sensation grossly intact distally       Vital Signs:    Vitals:    02/25/25 1006   BP: 132/82   Pulse: 94       Body mass index is 29.56 kg/m².    Right hip:  The patient's right hip with a really no range of motion.  It is near fused at this point in time.  Brisk capillary refill distally.  Pain in his buttocks and posterior leg with the any range of motion.     X-rays:  Three views right hip reviewed with the patient with end-stage osteoarthritis.  It has with loss of joint space and bone-on-bone articulation of the right hip.  Kellgren Evert grade 4 changes     Assessment::  Right hip osteoarthritis     Plan:  At this point the patient is tried and failed all conservative management with regards to their hip. They have tried and failed nonoperative management including: Anti-inflammatories, activity modification, the use of a cane. All of these have failed to completely remove their pain. They have pain going up and down stairs as well as walking on level ground. The hip pain is affecting activities of daily living They feel that theyve reached a point of disability with  regards to their hip. X-rays reveal advanced, end-stage degenerative osteoarthritis as noted by subchondral sclerosis, joint space narrowing, and periarticular osteophytes. The patient would like to proceed with surgical intervention and would be a good candidate for total hip replacement.     Total hip arthroplasty procedure, alternatives, risks, and benefits were discussed in detail. The risks including but not limited to: infection, need for revision surgery, pain, swelling, loosening, injury to surrounding neurovascular structures, stiffness, incomplete resolution of pain, DVT, PE, and death were discussed in detail. Despite these risks, the patient would like to proceed with surgical intervention. All questions were answered, no guarantees made. Will plan for right AQUILES on 3/12/25.        Given the patient's ankylosing spondylitis, as well as preoperative immobility, expect to be a slow recovery.  Plan to admit him overnight for aggressive physical therapy.    This note was created using [employer] voice recognition software that occasionally misinterpreted phrases or words.    Consult note is delivered via Epic messaging service.           [1]   Social History  Socioeconomic History    Marital status: Unknown   Tobacco Use    Smoking status: Every Day     Current packs/day: 0.25     Average packs/day: 0.3 packs/day for 12.0 years (3.0 ttl pk-yrs)     Types: Vaping with nicotine, Cigarettes     Start date: 2/25/2013    Smokeless tobacco: Never   Substance and Sexual Activity    Alcohol use: Yes     Comment: ocas    Drug use: Not Currently    Sexual activity: Yes

## 2025-02-25 NOTE — H&P (VIEW-ONLY)
Chief Complaint:   Chief Complaint   Patient presents with    Pre-op Exam     Pre-op RT AQUILES Rasheed sx 3/12/25 GL 4/26/25 - PT presents with a walker, not using daily just as needed. Pt states pain is little better, but still bothering him. He is able to walk around and do most indoor activities. Pain will come and go with stairs or long walks. Denies swelling. PT taking 2-3 NORCOs a week for pain as needed.        History of present illness: Wendy Matos is a 29 y.o. male, presents to clinic today in regards to the right hip.  Patient does have a well known history of osteoarthritis to this hip.  His pain is located more in the anterior groin.  This has worse with getting up from a low seated position, putting on socks and shoes and ambulating.  Currently ambulates with the assistance of a walker.  Does have a hard time leaving his apartment due to the amount of stairs.  His pain is continuous.  He has tried over-the-counter anti-inflammatories, lifestyle modifications and a previous injection.  These have failed to relieve his symptoms.  He feels as though he has reached a point of disability we would like to proceed with a right total hip arthroplasty.    Past Medical History:   Diagnosis Date    Arthritis     Hypertension        Past Surgical History:   Procedure Laterality Date    INJECTION OF JOINT Right 1/8/2025    Procedure: Injection, Joint, Hip;  Surgeon: Álvaro Mathias MD;  Location: [facility];  Service: Orthopedics;  Laterality: Right;  no anesthesia       Current Outpatient Medications   Medication Sig    celecoxib (CELEBREX) 200 MG capsule Take by mouth 2 (two) times daily.    HYDROcodone-acetaminophen (NORCO) 5-325 mg per tablet Take 1 tablet by mouth every 6 (six) hours as needed for Pain.    acetaminophen (TYLENOL) 500 MG tablet Take 500 mg by mouth every 6 (six) hours as needed for Pain.    meloxicam (MOBIC) 15 MG tablet Take 1 tablet (15 mg total) by mouth once daily. (Patient not taking: Reported on  2/25/2025)    methocarbamoL (ROBAXIN) 750 MG Tab Take by mouth. (Patient not taking: Reported on 2/25/2025)     No current facility-administered medications for this visit.       Review of patient's allergies indicates:  No Known Allergies    Family History   Problem Relation Name Age of Onset    Diabetes Mother      Hypertension Mother      Hypertension Father      Diabetes Father         Social History[1]        Review of Systems:    Denies fevers, chills, chest pain, shortness of breath. Comprehensive review of systems performed and otherwise negative except as noted in HPI     Physical Examination:    General: awake and alert, no acute distress, healthy appearing  Head and Neck: Head atraumatic/normocephalic. Moist MM  CV: brisk cap refill  Lungs: non-labored breathing, w/o cough or SOB  Skin: no rashes present, warm to touch  Neuro: sensation grossly intact distally       Vital Signs:    Vitals:    02/25/25 1006   BP: 132/82   Pulse: 94       Body mass index is 29.56 kg/m².    Right hip:  The patient's right hip with a really no range of motion.  It is near fused at this point in time.  Brisk capillary refill distally.  Pain in his buttocks and posterior leg with the any range of motion.     X-rays:  Three views right hip reviewed with the patient with end-stage osteoarthritis.  It has with loss of joint space and bone-on-bone articulation of the right hip.  Kellgren Evert grade 4 changes     Assessment::  Right hip osteoarthritis     Plan:  At this point the patient is tried and failed all conservative management with regards to their hip. They have tried and failed nonoperative management including: Anti-inflammatories, activity modification, the use of a cane. All of these have failed to completely remove their pain. They have pain going up and down stairs as well as walking on level ground. The hip pain is affecting activities of daily living They feel that theyve reached a point of disability with  regards to their hip. X-rays reveal advanced, end-stage degenerative osteoarthritis as noted by subchondral sclerosis, joint space narrowing, and periarticular osteophytes. The patient would like to proceed with surgical intervention and would be a good candidate for total hip replacement.     Total hip arthroplasty procedure, alternatives, risks, and benefits were discussed in detail. The risks including but not limited to: infection, need for revision surgery, pain, swelling, loosening, injury to surrounding neurovascular structures, stiffness, incomplete resolution of pain, DVT, PE, and death were discussed in detail. Despite these risks, the patient would like to proceed with surgical intervention. All questions were answered, no guarantees made. Will plan for right AQUILES on 3/12/25.        Given the patient's ankylosing spondylitis, as well as preoperative immobility, expect to be a slow recovery.  Plan to admit him overnight for aggressive physical therapy.    This note was created using [employer] voice recognition software that occasionally misinterpreted phrases or words.    Consult note is delivered via Epic messaging service.           [1]   Social History  Socioeconomic History    Marital status: Unknown   Tobacco Use    Smoking status: Every Day     Current packs/day: 0.25     Average packs/day: 0.3 packs/day for 12.0 years (3.0 ttl pk-yrs)     Types: Vaping with nicotine, Cigarettes     Start date: 2/25/2013    Smokeless tobacco: Never   Substance and Sexual Activity    Alcohol use: Yes     Comment: ocas    Drug use: Not Currently    Sexual activity: Yes

## 2025-03-07 ENCOUNTER — ANESTHESIA EVENT (OUTPATIENT)
Dept: SURGERY | Facility: HOSPITAL | Age: 30
End: 2025-03-07
Payer: COMMERCIAL

## 2025-03-11 ENCOUNTER — TELEPHONE (OUTPATIENT)
Dept: ORTHOPEDICS | Facility: CLINIC | Age: 30
End: 2025-03-11
Payer: COMMERCIAL

## 2025-03-11 NOTE — TELEPHONE ENCOUNTER
Patient called to check on arrival time for TJR surgery tomorrow. Advised he will receive a call from surgical staff today between 1-5 pm to let him know what time to be here tomorrow, verbalized understanding.

## 2025-03-12 ENCOUNTER — ANESTHESIA (OUTPATIENT)
Dept: SURGERY | Facility: HOSPITAL | Age: 30
End: 2025-03-12
Payer: COMMERCIAL

## 2025-03-12 ENCOUNTER — HOSPITAL ENCOUNTER (OUTPATIENT)
Facility: HOSPITAL | Age: 30
Discharge: HOME HEALTH | End: 2025-03-14
Attending: ORTHOPAEDIC SURGERY | Admitting: ORTHOPAEDIC SURGERY
Payer: COMMERCIAL

## 2025-03-12 DIAGNOSIS — R79.9 ABNORMAL BLOOD CHEMISTRY LEVEL: ICD-10-CM

## 2025-03-12 DIAGNOSIS — M16.11 PRIMARY OSTEOARTHRITIS OF RIGHT HIP: ICD-10-CM

## 2025-03-12 DIAGNOSIS — Z01.818 PREOPERATIVE TESTING: ICD-10-CM

## 2025-03-12 DIAGNOSIS — M19.90 OSTEOARTHRITIS: ICD-10-CM

## 2025-03-12 LAB
HCT VFR BLD AUTO: 36.7 % (ref 42–52)
HGB BLD-MCNC: 12.3 G/DL (ref 14–18)
POCT GLUCOSE: 145 MG/DL (ref 70–110)
POCT GLUCOSE: 184 MG/DL (ref 70–110)

## 2025-03-12 PROCEDURE — 94761 N-INVAS EAR/PLS OXIMETRY MLT: CPT

## 2025-03-12 PROCEDURE — 27130 TOTAL HIP ARTHROPLASTY: CPT | Mod: AS,RT,, | Performed by: NURSE PRACTITIONER

## 2025-03-12 PROCEDURE — 85018 HEMOGLOBIN: CPT | Performed by: ORTHOPAEDIC SURGERY

## 2025-03-12 PROCEDURE — 25000003 PHARM REV CODE 250: Performed by: ORTHOPAEDIC SURGERY

## 2025-03-12 PROCEDURE — 63600175 PHARM REV CODE 636 W HCPCS: Mod: JZ,TB | Performed by: ORTHOPAEDIC SURGERY

## 2025-03-12 PROCEDURE — A4216 STERILE WATER/SALINE, 10 ML: HCPCS | Performed by: ORTHOPAEDIC SURGERY

## 2025-03-12 PROCEDURE — 36000712 HC OR TIME LEV V 1ST 15 MIN: Performed by: ORTHOPAEDIC SURGERY

## 2025-03-12 PROCEDURE — 71000033 HC RECOVERY, INTIAL HOUR: Performed by: ORTHOPAEDIC SURGERY

## 2025-03-12 PROCEDURE — 25000003 PHARM REV CODE 250: Performed by: NURSE PRACTITIONER

## 2025-03-12 PROCEDURE — 27000221 HC OXYGEN, UP TO 24 HOURS

## 2025-03-12 PROCEDURE — C1713 ANCHOR/SCREW BN/BN,TIS/BN: HCPCS | Performed by: ORTHOPAEDIC SURGERY

## 2025-03-12 PROCEDURE — 36000713 HC OR TIME LEV V EA ADD 15 MIN: Performed by: ORTHOPAEDIC SURGERY

## 2025-03-12 PROCEDURE — P9047 ALBUMIN (HUMAN), 25%, 50ML: HCPCS

## 2025-03-12 PROCEDURE — 25000003 PHARM REV CODE 250

## 2025-03-12 PROCEDURE — 37000008 HC ANESTHESIA 1ST 15 MINUTES: Performed by: ORTHOPAEDIC SURGERY

## 2025-03-12 PROCEDURE — 36415 COLL VENOUS BLD VENIPUNCTURE: CPT | Performed by: ORTHOPAEDIC SURGERY

## 2025-03-12 PROCEDURE — 82962 GLUCOSE BLOOD TEST: CPT | Performed by: ORTHOPAEDIC SURGERY

## 2025-03-12 PROCEDURE — 0055T BONE SRGRY CMPTR CT/MRI IMAG: CPT | Mod: ,,, | Performed by: ORTHOPAEDIC SURGERY

## 2025-03-12 PROCEDURE — 99900031 HC PATIENT EDUCATION (STAT)

## 2025-03-12 PROCEDURE — 37000009 HC ANESTHESIA EA ADD 15 MINS: Performed by: ORTHOPAEDIC SURGERY

## 2025-03-12 PROCEDURE — 63600175 PHARM REV CODE 636 W HCPCS

## 2025-03-12 PROCEDURE — G0378 HOSPITAL OBSERVATION PER HR: HCPCS

## 2025-03-12 PROCEDURE — 51798 US URINE CAPACITY MEASURE: CPT

## 2025-03-12 PROCEDURE — 97162 PT EVAL MOD COMPLEX 30 MIN: CPT

## 2025-03-12 PROCEDURE — 94799 UNLISTED PULMONARY SVC/PX: CPT

## 2025-03-12 PROCEDURE — 27130 TOTAL HIP ARTHROPLASTY: CPT | Mod: RT,,, | Performed by: ORTHOPAEDIC SURGERY

## 2025-03-12 PROCEDURE — C1776 JOINT DEVICE (IMPLANTABLE): HCPCS | Performed by: ORTHOPAEDIC SURGERY

## 2025-03-12 PROCEDURE — 27201423 OPTIME MED/SURG SUP & DEVICES STERILE SUPPLY: Performed by: ORTHOPAEDIC SURGERY

## 2025-03-12 PROCEDURE — 63600175 PHARM REV CODE 636 W HCPCS: Performed by: ORTHOPAEDIC SURGERY

## 2025-03-12 DEVICE — TRIDENT X3 0 DEGREE POLYETHYLENE INSERT
Type: IMPLANTABLE DEVICE | Site: HIP | Status: FUNCTIONAL
Brand: TRIDENT X3 INSERT

## 2025-03-12 DEVICE — TRIDENT II TRITANIUM CLUSTER 50D
Type: IMPLANTABLE DEVICE | Site: HIP | Status: FUNCTIONAL
Brand: TRIDENT II

## 2025-03-12 DEVICE — 127 DEGREE NECK ANGLE HIP STEM
Type: IMPLANTABLE DEVICE | Site: HIP | Status: FUNCTIONAL
Brand: ACCOLADE

## 2025-03-12 DEVICE — CERAMIC V40 FEMORAL HEAD
Type: IMPLANTABLE DEVICE | Site: HIP | Status: FUNCTIONAL
Brand: BIOLOX

## 2025-03-12 RX ORDER — ALBUMIN HUMAN 250 G/1000ML
SOLUTION INTRAVENOUS
Status: DISPENSED
Start: 2025-03-12 | End: 2025-03-12

## 2025-03-12 RX ORDER — ADHESIVE BANDAGE
30 BANDAGE TOPICAL DAILY PRN
Status: DISCONTINUED | OUTPATIENT
Start: 2025-03-12 | End: 2025-03-14 | Stop reason: HOSPADM

## 2025-03-12 RX ORDER — SODIUM CHLORIDE 9 MG/ML
INJECTION, SOLUTION INTRAVENOUS CONTINUOUS
Status: ACTIVE | OUTPATIENT
Start: 2025-03-12 | End: 2025-03-13

## 2025-03-12 RX ORDER — ACETAMINOPHEN 500 MG
1000 TABLET ORAL
Status: COMPLETED | OUTPATIENT
Start: 2025-03-12 | End: 2025-03-12

## 2025-03-12 RX ORDER — ROCURONIUM BROMIDE 10 MG/ML
INJECTION, SOLUTION INTRAVENOUS
Status: DISCONTINUED | OUTPATIENT
Start: 2025-03-12 | End: 2025-03-12

## 2025-03-12 RX ORDER — METHOCARBAMOL 750 MG/1
750 TABLET, FILM COATED ORAL EVERY 8 HOURS PRN
Status: DISCONTINUED | OUTPATIENT
Start: 2025-03-12 | End: 2025-03-13

## 2025-03-12 RX ORDER — AMOXICILLIN 250 MG
2 CAPSULE ORAL 2 TIMES DAILY
Status: DISCONTINUED | OUTPATIENT
Start: 2025-03-12 | End: 2025-03-14 | Stop reason: HOSPADM

## 2025-03-12 RX ORDER — BISACODYL 10 MG/1
10 SUPPOSITORY RECTAL DAILY
Status: DISCONTINUED | OUTPATIENT
Start: 2025-03-15 | End: 2025-03-14 | Stop reason: HOSPADM

## 2025-03-12 RX ORDER — ALBUMIN HUMAN 250 G/1000ML
SOLUTION INTRAVENOUS
Status: DISCONTINUED | OUTPATIENT
Start: 2025-03-12 | End: 2025-03-12

## 2025-03-12 RX ORDER — NAPROXEN SODIUM 220 MG/1
81 TABLET, FILM COATED ORAL 2 TIMES DAILY
Status: DISCONTINUED | OUTPATIENT
Start: 2025-03-13 | End: 2025-03-14 | Stop reason: HOSPADM

## 2025-03-12 RX ORDER — DEXAMETHASONE SODIUM PHOSPHATE 4 MG/ML
INJECTION, SOLUTION INTRA-ARTICULAR; INTRALESIONAL; INTRAMUSCULAR; INTRAVENOUS; SOFT TISSUE
Status: DISCONTINUED | OUTPATIENT
Start: 2025-03-12 | End: 2025-03-12

## 2025-03-12 RX ORDER — MORPHINE SULFATE 10 MG/ML
INJECTION INTRAMUSCULAR; INTRAVENOUS; SUBCUTANEOUS
Status: DISPENSED
Start: 2025-03-12 | End: 2025-03-12

## 2025-03-12 RX ORDER — DIPHENHYDRAMINE HYDROCHLORIDE 50 MG/ML
25 INJECTION, SOLUTION INTRAMUSCULAR; INTRAVENOUS EVERY 6 HOURS PRN
Status: DISCONTINUED | OUTPATIENT
Start: 2025-03-12 | End: 2025-03-12 | Stop reason: HOSPADM

## 2025-03-12 RX ORDER — EPINEPHRINE 1 MG/ML
INJECTION, SOLUTION, CONCENTRATE INTRAVENOUS
Status: DISCONTINUED | OUTPATIENT
Start: 2025-03-12 | End: 2025-03-12 | Stop reason: HOSPADM

## 2025-03-12 RX ORDER — SCOPOLAMINE 1 MG/3D
1 PATCH, EXTENDED RELEASE TRANSDERMAL ONCE AS NEEDED
Status: DISCONTINUED | OUTPATIENT
Start: 2025-03-12 | End: 2025-03-12 | Stop reason: HOSPADM

## 2025-03-12 RX ORDER — KETOROLAC TROMETHAMINE 30 MG/ML
INJECTION, SOLUTION INTRAMUSCULAR; INTRAVENOUS
Status: DISPENSED
Start: 2025-03-12 | End: 2025-03-12

## 2025-03-12 RX ORDER — METOCLOPRAMIDE HYDROCHLORIDE 5 MG/ML
10 INJECTION INTRAMUSCULAR; INTRAVENOUS
Status: DISCONTINUED | OUTPATIENT
Start: 2025-03-12 | End: 2025-03-13

## 2025-03-12 RX ORDER — ACETAMINOPHEN 500 MG
500 TABLET ORAL EVERY 4 HOURS
Status: DISCONTINUED | OUTPATIENT
Start: 2025-03-12 | End: 2025-03-13

## 2025-03-12 RX ORDER — GABAPENTIN 300 MG/1
300 CAPSULE ORAL
Status: COMPLETED | OUTPATIENT
Start: 2025-03-12 | End: 2025-03-12

## 2025-03-12 RX ORDER — VANCOMYCIN HYDROCHLORIDE 1 G/20ML
INJECTION, POWDER, LYOPHILIZED, FOR SOLUTION INTRAVENOUS
Status: DISPENSED
Start: 2025-03-12 | End: 2025-03-12

## 2025-03-12 RX ORDER — SODIUM CHLORIDE 9 MG/ML
INJECTION, SOLUTION INTRAMUSCULAR; INTRAVENOUS; SUBCUTANEOUS
Status: DISCONTINUED | OUTPATIENT
Start: 2025-03-12 | End: 2025-03-12 | Stop reason: HOSPADM

## 2025-03-12 RX ORDER — ONDANSETRON HYDROCHLORIDE 2 MG/ML
4 INJECTION, SOLUTION INTRAVENOUS EVERY 6 HOURS PRN
Status: DISCONTINUED | OUTPATIENT
Start: 2025-03-12 | End: 2025-03-14 | Stop reason: HOSPADM

## 2025-03-12 RX ORDER — ROPIVACAINE HYDROCHLORIDE 5 MG/ML
INJECTION, SOLUTION EPIDURAL; INFILTRATION; PERINEURAL
Status: DISCONTINUED | OUTPATIENT
Start: 2025-03-12 | End: 2025-03-12 | Stop reason: HOSPADM

## 2025-03-12 RX ORDER — KETOROLAC TROMETHAMINE 30 MG/ML
INJECTION, SOLUTION INTRAMUSCULAR; INTRAVENOUS
Status: DISCONTINUED | OUTPATIENT
Start: 2025-03-12 | End: 2025-03-12 | Stop reason: HOSPADM

## 2025-03-12 RX ORDER — PROCHLORPERAZINE EDISYLATE 5 MG/ML
5 INJECTION INTRAMUSCULAR; INTRAVENOUS EVERY 30 MIN PRN
Status: DISCONTINUED | OUTPATIENT
Start: 2025-03-12 | End: 2025-03-12 | Stop reason: HOSPADM

## 2025-03-12 RX ORDER — CEFAZOLIN SODIUM 1 G/3ML
INJECTION, POWDER, FOR SOLUTION INTRAMUSCULAR; INTRAVENOUS
Status: DISCONTINUED | OUTPATIENT
Start: 2025-03-12 | End: 2025-03-12

## 2025-03-12 RX ORDER — HYDROCODONE BITARTRATE AND ACETAMINOPHEN 5; 325 MG/1; MG/1
1 TABLET ORAL EVERY 4 HOURS PRN
Status: DISCONTINUED | OUTPATIENT
Start: 2025-03-12 | End: 2025-03-13

## 2025-03-12 RX ORDER — HYDROMORPHONE HYDROCHLORIDE 2 MG/ML
INJECTION, SOLUTION INTRAMUSCULAR; INTRAVENOUS; SUBCUTANEOUS
Status: DISCONTINUED | OUTPATIENT
Start: 2025-03-12 | End: 2025-03-12

## 2025-03-12 RX ORDER — SODIUM CHLORIDE, SODIUM GLUCONATE, SODIUM ACETATE, POTASSIUM CHLORIDE AND MAGNESIUM CHLORIDE 30; 37; 368; 526; 502 MG/100ML; MG/100ML; MG/100ML; MG/100ML; MG/100ML
INJECTION, SOLUTION INTRAVENOUS CONTINUOUS
Status: DISCONTINUED | OUTPATIENT
Start: 2025-03-12 | End: 2025-03-12

## 2025-03-12 RX ORDER — FENTANYL CITRATE 50 UG/ML
INJECTION, SOLUTION INTRAMUSCULAR; INTRAVENOUS
Status: DISCONTINUED | OUTPATIENT
Start: 2025-03-12 | End: 2025-03-12

## 2025-03-12 RX ORDER — KETOROLAC TROMETHAMINE 10 MG/1
10 TABLET, FILM COATED ORAL
Status: COMPLETED | OUTPATIENT
Start: 2025-03-12 | End: 2025-03-12

## 2025-03-12 RX ORDER — VANCOMYCIN HYDROCHLORIDE 1 G/20ML
INJECTION, POWDER, LYOPHILIZED, FOR SOLUTION INTRAVENOUS
Status: DISCONTINUED | OUTPATIENT
Start: 2025-03-12 | End: 2025-03-12 | Stop reason: HOSPADM

## 2025-03-12 RX ORDER — PROPOFOL 10 MG/ML
VIAL (ML) INTRAVENOUS
Status: DISCONTINUED | OUTPATIENT
Start: 2025-03-12 | End: 2025-03-12

## 2025-03-12 RX ORDER — SODIUM CHLORIDE 0.9 % (FLUSH) 0.9 %
SYRINGE (ML) INJECTION
Status: DISPENSED
Start: 2025-03-12 | End: 2025-03-12

## 2025-03-12 RX ORDER — TRANEXAMIC ACID 650 MG/1
1950 TABLET ORAL
Status: COMPLETED | OUTPATIENT
Start: 2025-03-12 | End: 2025-03-12

## 2025-03-12 RX ORDER — TALC
6 POWDER (GRAM) TOPICAL NIGHTLY PRN
Status: DISCONTINUED | OUTPATIENT
Start: 2025-03-12 | End: 2025-03-14 | Stop reason: HOSPADM

## 2025-03-12 RX ORDER — DEXMEDETOMIDINE HYDROCHLORIDE 100 UG/ML
INJECTION, SOLUTION INTRAVENOUS
Status: DISCONTINUED | OUTPATIENT
Start: 2025-03-12 | End: 2025-03-12

## 2025-03-12 RX ORDER — POLYETHYLENE GLYCOL 3350 17 G/17G
17 POWDER, FOR SOLUTION ORAL NIGHTLY
Status: DISCONTINUED | OUTPATIENT
Start: 2025-03-12 | End: 2025-03-14 | Stop reason: HOSPADM

## 2025-03-12 RX ORDER — TAMSULOSIN HYDROCHLORIDE 0.4 MG/1
0.4 CAPSULE ORAL
Status: COMPLETED | OUTPATIENT
Start: 2025-03-12 | End: 2025-03-12

## 2025-03-12 RX ORDER — ALBUMIN HUMAN 250 G/1000ML
SOLUTION INTRAVENOUS
Status: COMPLETED
Start: 2025-03-12 | End: 2025-03-12

## 2025-03-12 RX ORDER — FAMOTIDINE 20 MG/1
20 TABLET, FILM COATED ORAL
Status: DISCONTINUED | OUTPATIENT
Start: 2025-03-13 | End: 2025-03-14 | Stop reason: HOSPADM

## 2025-03-12 RX ORDER — ONDANSETRON 4 MG/1
4 TABLET, ORALLY DISINTEGRATING ORAL
Status: COMPLETED | OUTPATIENT
Start: 2025-03-12 | End: 2025-03-12

## 2025-03-12 RX ORDER — TRAMADOL HYDROCHLORIDE 50 MG/1
50 TABLET ORAL EVERY 4 HOURS PRN
Status: DISCONTINUED | OUTPATIENT
Start: 2025-03-12 | End: 2025-03-13

## 2025-03-12 RX ORDER — CEFAZOLIN 2 G/1
2 INJECTION, POWDER, FOR SOLUTION INTRAMUSCULAR; INTRAVENOUS
Status: COMPLETED | OUTPATIENT
Start: 2025-03-12 | End: 2025-03-13

## 2025-03-12 RX ORDER — ROPIVACAINE HYDROCHLORIDE 5 MG/ML
INJECTION, SOLUTION EPIDURAL; INFILTRATION; PERINEURAL
Status: DISPENSED
Start: 2025-03-12 | End: 2025-03-12

## 2025-03-12 RX ORDER — MORPHINE SULFATE 10 MG/ML
INJECTION INTRAMUSCULAR; INTRAVENOUS; SUBCUTANEOUS
Status: DISCONTINUED | OUTPATIENT
Start: 2025-03-12 | End: 2025-03-12 | Stop reason: HOSPADM

## 2025-03-12 RX ORDER — KETOROLAC TROMETHAMINE 30 MG/ML
15 INJECTION, SOLUTION INTRAMUSCULAR; INTRAVENOUS EVERY 6 HOURS
Status: DISCONTINUED | OUTPATIENT
Start: 2025-03-12 | End: 2025-03-13

## 2025-03-12 RX ORDER — DOCUSATE SODIUM 100 MG/1
200 CAPSULE, LIQUID FILLED ORAL
Status: DISCONTINUED | OUTPATIENT
Start: 2025-03-13 | End: 2025-03-14 | Stop reason: HOSPADM

## 2025-03-12 RX ORDER — METOCLOPRAMIDE HYDROCHLORIDE 5 MG/ML
10 INJECTION INTRAMUSCULAR; INTRAVENOUS EVERY 10 MIN PRN
Status: DISCONTINUED | OUTPATIENT
Start: 2025-03-12 | End: 2025-03-12 | Stop reason: HOSPADM

## 2025-03-12 RX ORDER — EPINEPHRINE 1 MG/ML
INJECTION, SOLUTION, CONCENTRATE INTRAVENOUS
Status: DISPENSED
Start: 2025-03-12 | End: 2025-03-12

## 2025-03-12 RX ORDER — MORPHINE SULFATE 4 MG/ML
4 INJECTION, SOLUTION INTRAMUSCULAR; INTRAVENOUS
Status: ACTIVE | OUTPATIENT
Start: 2025-03-12 | End: 2025-03-13

## 2025-03-12 RX ORDER — GABAPENTIN 300 MG/1
300 CAPSULE ORAL NIGHTLY
Status: DISCONTINUED | OUTPATIENT
Start: 2025-03-12 | End: 2025-03-14 | Stop reason: HOSPADM

## 2025-03-12 RX ORDER — MIDAZOLAM HYDROCHLORIDE 1 MG/ML
INJECTION INTRAMUSCULAR; INTRAVENOUS
Status: DISCONTINUED | OUTPATIENT
Start: 2025-03-12 | End: 2025-03-12

## 2025-03-12 RX ORDER — ALUMINUM HYDROXIDE, MAGNESIUM HYDROXIDE, AND SIMETHICONE 1200; 120; 1200 MG/30ML; MG/30ML; MG/30ML
30 SUSPENSION ORAL EVERY 6 HOURS PRN
Status: DISCONTINUED | OUTPATIENT
Start: 2025-03-12 | End: 2025-03-14 | Stop reason: HOSPADM

## 2025-03-12 RX ORDER — ACETAMINOPHEN 10 MG/ML
1000 INJECTION, SOLUTION INTRAVENOUS ONCE
Status: COMPLETED | OUTPATIENT
Start: 2025-03-12 | End: 2025-03-12

## 2025-03-12 RX ADMIN — TAMSULOSIN HYDROCHLORIDE 0.4 MG: 0.4 CAPSULE ORAL at 08:03

## 2025-03-12 RX ADMIN — ROCURONIUM BROMIDE 50 MG: 10 SOLUTION INTRAVENOUS at 09:03

## 2025-03-12 RX ADMIN — ACETAMINOPHEN 500 MG: 500 TABLET ORAL at 09:03

## 2025-03-12 RX ADMIN — SODIUM CHLORIDE: 9 INJECTION, SOLUTION INTRAVENOUS at 11:03

## 2025-03-12 RX ADMIN — FENTANYL CITRATE 25 MCG: 50 INJECTION, SOLUTION INTRAMUSCULAR; INTRAVENOUS at 11:03

## 2025-03-12 RX ADMIN — SENNOSIDES AND DOCUSATE SODIUM 2 TABLET: 50; 8.6 TABLET ORAL at 09:03

## 2025-03-12 RX ADMIN — DEXMEDETOMIDINE HYDROCHLORIDE 4 MCG: 100 INJECTION, SOLUTION INTRAVENOUS at 10:03

## 2025-03-12 RX ADMIN — HYDROMORPHONE HYDROCHLORIDE 1 MG: 2 INJECTION, SOLUTION INTRAMUSCULAR; INTRAVENOUS; SUBCUTANEOUS at 11:03

## 2025-03-12 RX ADMIN — SODIUM CHLORIDE, SODIUM GLUCONATE, SODIUM ACETATE, POTASSIUM CHLORIDE AND MAGNESIUM CHLORIDE: 526; 502; 368; 37; 30 INJECTION, SOLUTION INTRAVENOUS at 09:03

## 2025-03-12 RX ADMIN — GABAPENTIN 300 MG: 300 CAPSULE ORAL at 09:03

## 2025-03-12 RX ADMIN — SODIUM CHLORIDE: 9 INJECTION, SOLUTION INTRAVENOUS at 12:03

## 2025-03-12 RX ADMIN — ALBUMIN (HUMAN) 100 ML: 5 SOLUTION INTRAVENOUS at 11:03

## 2025-03-12 RX ADMIN — ACETAMINOPHEN 1000 MG: 10 INJECTION INTRAVENOUS at 06:03

## 2025-03-12 RX ADMIN — TRANEXAMIC ACID 1950 MG: 650 TABLET ORAL at 08:03

## 2025-03-12 RX ADMIN — ONDANSETRON 4 MG: 4 TABLET, ORALLY DISINTEGRATING ORAL at 08:03

## 2025-03-12 RX ADMIN — METOCLOPRAMIDE HYDROCHLORIDE 10 MG: 5 INJECTION INTRAMUSCULAR; INTRAVENOUS at 05:03

## 2025-03-12 RX ADMIN — FENTANYL CITRATE 75 MCG: 50 INJECTION, SOLUTION INTRAMUSCULAR; INTRAVENOUS at 09:03

## 2025-03-12 RX ADMIN — ROCURONIUM BROMIDE 10 MG: 10 SOLUTION INTRAVENOUS at 10:03

## 2025-03-12 RX ADMIN — FENTANYL CITRATE 75 MCG: 50 INJECTION, SOLUTION INTRAMUSCULAR; INTRAVENOUS at 11:03

## 2025-03-12 RX ADMIN — DEXAMETHASONE SODIUM PHOSPHATE 4 MG: 4 INJECTION, SOLUTION INTRA-ARTICULAR; INTRALESIONAL; INTRAMUSCULAR; INTRAVENOUS; SOFT TISSUE at 09:03

## 2025-03-12 RX ADMIN — SODIUM CHLORIDE, SODIUM GLUCONATE, SODIUM ACETATE, POTASSIUM CHLORIDE AND MAGNESIUM CHLORIDE: 526; 502; 368; 37; 30 INJECTION, SOLUTION INTRAVENOUS at 08:03

## 2025-03-12 RX ADMIN — ROCURONIUM BROMIDE 20 MG: 10 SOLUTION INTRAVENOUS at 10:03

## 2025-03-12 RX ADMIN — ALBUMIN (HUMAN) 100 ML: 5 SOLUTION INTRAVENOUS at 10:03

## 2025-03-12 RX ADMIN — KETOROLAC TROMETHAMINE 10 MG: 10 TABLET, FILM COATED ORAL at 08:03

## 2025-03-12 RX ADMIN — GABAPENTIN 300 MG: 300 CAPSULE ORAL at 08:03

## 2025-03-12 RX ADMIN — POLYETHYLENE GLYCOL 3350 17 G: 17 POWDER, FOR SOLUTION ORAL at 09:03

## 2025-03-12 RX ADMIN — KETOROLAC TROMETHAMINE 15 MG: 30 INJECTION, SOLUTION INTRAMUSCULAR at 07:03

## 2025-03-12 RX ADMIN — CEFAZOLIN 2 G: 2 INJECTION, POWDER, FOR SOLUTION INTRAMUSCULAR; INTRAVENOUS at 05:03

## 2025-03-12 RX ADMIN — MIDAZOLAM 2 MG: 1 INJECTION INTRAMUSCULAR; INTRAVENOUS at 09:03

## 2025-03-12 RX ADMIN — HYDROCODONE BITARTRATE AND ACETAMINOPHEN 1 TABLET: 5; 325 TABLET ORAL at 12:03

## 2025-03-12 RX ADMIN — FENTANYL CITRATE 25 MCG: 50 INJECTION, SOLUTION INTRAMUSCULAR; INTRAVENOUS at 10:03

## 2025-03-12 RX ADMIN — PROPOFOL 200 MG: 10 INJECTION, EMULSION INTRAVENOUS at 09:03

## 2025-03-12 RX ADMIN — METOCLOPRAMIDE HYDROCHLORIDE 10 MG: 5 INJECTION INTRAMUSCULAR; INTRAVENOUS at 09:03

## 2025-03-12 RX ADMIN — ACETAMINOPHEN 1000 MG: 500 TABLET ORAL at 08:03

## 2025-03-12 RX ADMIN — METHOCARBAMOL 750 MG: 750 TABLET ORAL at 09:03

## 2025-03-12 RX ADMIN — SUGAMMADEX 200 MG: 100 INJECTION, SOLUTION INTRAVENOUS at 11:03

## 2025-03-12 RX ADMIN — CEFAZOLIN 2 G: 330 INJECTION, POWDER, FOR SOLUTION INTRAMUSCULAR; INTRAVENOUS at 09:03

## 2025-03-12 NOTE — TRANSFER OF CARE
"Anesthesia Transfer of Care Note    Patient: Wendy Matos    Procedure(s) Performed: Procedure(s) (LRB):  ROBOTIC ARTHROPLASTY, HIP, TOTAL, POSTERIOR APPROACH (Right)    Patient location: PACU    Anesthesia Type: general    Transport from OR: Transported from OR on room air with adequate spontaneous ventilation    Post pain: adequate analgesia    Post assessment: no apparent anesthetic complications    Post vital signs: stable    Level of consciousness: awake, alert and oriented    Nausea/Vomiting: no nausea/vomiting    Complications: none    Transfer of care protocol was followed      Last vitals: Visit Vitals  BP (!) 144/73 (BP Location: Right arm, Patient Position: Lying)   Pulse (!) 111   Temp 36.5 °C (97.7 °F) (Skin)   Resp (!) 23   Ht 5' 9" (1.753 m)   Wt 90.7 kg (200 lb)   SpO2 100%   BMI 29.53 kg/m²     "

## 2025-03-12 NOTE — ANESTHESIA PROCEDURE NOTES
Intubation    Date/Time: 3/12/2025 9:35 AM    Performed by: Fela Caldwell CRNA  Authorized by: Renan Ambrose MD    Intubation:     Induction:  Intravenous    Intubated:  Postinduction    Mask Ventilation:  Easy mask    Attempts:  1    Attempted By:  CRNA    Method of Intubation:  Direct    Blade:  Mishra 2    Laryngeal View Grade: Grade I - full view of cords      Difficult Airway Encountered?: No      Complications:  None    Airway Device:  Oral endotracheal tube    Airway Device Size:  7.5    Style/Cuff Inflation:  Cuffed (inflated to minimal occlusive pressure)    Inflation Amount (mL):  6    Tube secured:  23    Secured at:  The lips    Placement Verified By:  Capnometry    Complicating Factors:  None    Findings Post-Intubation:  BS equal bilateral and atraumatic/condition of teeth unchanged

## 2025-03-12 NOTE — OP NOTE
OPERATIVE REPORT      Patient: Wendy Matos   : 1995    MRN: [id number]  Date: 2025      Surgeon: Álvaro Mathias MD  Assistant: Alice Godinez NP, VANGIE.  Certified first assist was necessary as a skilled set of hands and was necessary for proper patient positioning as well as assistance with closure in place with multiple implants.  Preoperative Diagnosis:  Right hip primary osteoarthritis  Postoperative Diagnosis: Right Hip primary osteoarthritis, periprosthetic femur fracture  Procedure:  Right Rasheed total hip arthroplasty (76227)  ORIF R femur using cerclage  Anesthesiologist: Renan Ambrose MD  OR Staff: Circulator: Hayden Garcia RN  Nurse Practitioner: Alice Godinez FNP  Scrub Person: Shane Torres ST  Implants:   Implant Name Type Inv. Item Serial No.  Lot No. LRB No. Used Action   PIN BONE 4 X 140MM STERILE - [id number]  PIN BONE 4 X 140MM STERILE  [employer]  Right 1 Implanted and Explanted   PIN BONE 4 X 140MM STERILE - [id number]  PIN BONE 4 X 140MM STERILE  [employer]  Right 1 Implanted and Explanted   SHELL TRIDENT II CLUSTER 50MM - [id number]  SHELL TRIDENT II CLUSTER 50MM  [employer]. [id number] Right 1 Implanted   INSERT TRIDENT X3 0 DEG 32MM D - [id number]  INSERT TRIDENT X3 0 DEG 32MM D  [employer]. [id number] Right 1 Implanted   STEM FEM ACCOLADE2 SZ6 35X111 - [id number]  STEM FEM ACCOLADE2 SZ6 35X111  [employer]. [id number] Right 1 Implanted   SET CABLE BEADED 2MM - [id number]  SET CABLE BEADED 2MM  [employer] [id number] Right 1 Implanted and Explanted   SET CABLE BEADED 2MM - [id number]  SET CABLE BEADED 2MM  [employer] [id number] Right 1 Implanted   HEAD DELTA V-40 32MM - [id number]  HEAD DELTA V-40 32MM  [employer]. [id number] Right 1 Implanted     EBL: 600  Complications: None  Disposition:  To PACU stable      Indications: Wendy Matos is a 29 y.o. male presenting with ongoing complaints of right hip pain.  Patient had tried and failed all conservative management including anti-inflammatories, activity modification, the use of a cane, home exercise program.  Despite this he continued to have significant complaints of right hip pain.  Patient felt as though we reached a point of disability with regards to his right hip and would like to proceed with total hip arthroplasty.  The risks, benefits, alternatives to total hip arthroplasty were discussed in detail with the patient.  These include but are limited to infection, bleeding, scarring, need for revision surgery, included resolution of pain, DVT, PE, death.  Despite these risks, he elected to proceed with surgical intervention.  All questions answered to the patient's satisfaction.  No guarantees made.  The patient voiced understanding and written as well as verbal consent was obtained by myself prior to the procedure.    Procedure Note:  The patient was seen in the preoperative holding area.  He was marked and consented for surgery.  Again all questions were answered.  Taken the operating room was placed under anesthesia.  Placed in the left lateral decubitus position with all bony prominences well padded.  Right hip prepped draped normal sterile fashion.  A time-out was performed verifying correct patient site side and procedure.  All were in agreement.  Preoperative antibiotics were administered as well as TXA.    Three Ryley pins were attached percutaneously to the iliac crest.  DocbookMDs array was placed in appropriate position.  Standard direct superior approach to the hip was performed centered on the posterior 3rd of the greater trochanter.  Taken down through the skin and subcutaneous tissue down the fascia.  Fascia was split in line with the fibers.  Charnley retractor was placed.  Check point placed in the greater trochanter.  Piriformis tendon was  identified.  Arthrotomy was made on the superior aspect of the piriformis and taken distally through the superior aspect of the short external rotators in a hockey-stick style fashion.  Hip was internally rotated and dislocated.  Sagittal saw was used to make an osteotomy 1-2 fingerbreadths proximal to the level of lesser trochanter.  The retractors placed around the acetabulum and excellent exposure was had.  Removed the labrum as well pulvinar.  Registered the acetabulum using Swyft protocol with excellent registration.  Brought in the Rasheed robot and reamed to a 50 Reamer.  We then impacted a 50 Trident II acetabulum component into position found to be down.   Acetabulum was impacted with 40° of inclination and 20° of anteversion.  We then impacted the liner into position and found it to be down.    Attention then turned to the femur.  Hip was internally rotated exposing the femur.  Retractors were placed.  Cookie cutter used to take out the lateral femoral neck.  Canal finder to open up the femoral canal.  We then broached sequentially up to a size 6 broach.  This came to a firm and solid stop with a change in pitch.  We then trialed at this point using a trial femoral neck and head.  Hip was taken through full range of motion and found to be stable with no anterior or posterior instability and good recreation of leg lengths as well as offset.  This was confirmed using measurements from the Rasheed robot.  We then dislocated the hip confirm stability of femoral broach.  At this point we noted a minimally displaced medial calcar fracture.  Cable was opened and passed proximal to the lesser trochanter and around the greater trochanter.  It was tightened and crimped.  Fracture remained reduced and stable.  We then opened the implant impacted the a 6 femoral component position.  It came down to the previous level of resection with again a firm solid stop. Medial calcar fracture remained reduced.  32+0 Biolox head was  opened and impacted onto the femoral trunnion after cleaning and drying the trunnion.  Hip was reduced and again taken to full range of motion.  Excellent recreation of leg length was felt and no anterior or posterior instability found.    Within turned our attention toward closure.  Wound was copiously irrigated with normal saline followed by Betadine lavage and more normal saline.  We closed the piriformis tendon as well as the short external rotators back to the greater trochanter through drill tunnels using #2 FiberWire sutures.  We injected our joint cocktail.  Fascia closed with #1 Stratafix suture in running fashion.  2-0 Monocryl on the subcutaneous tissue followed by skin closure.    All sponge and needle counts were correct at the end of the case.  I was present and participated in all key and critical aspects of the procedure.    Prognosis:  Patient arose from anesthesia without any issues.  Was transferred to recovery room stable condition.  Postoperatively 50% weight-bearing as tolerated to the left lower extremity with appropriate hip precautions.  Plan to discharge home versus rehab pending physical therapy evaluation.    This note/OR report was created with the assistance of  voice recognition software or phone  dictation.  There may be transcription errors as a result of using this technology however minimal. Effort has been made to assure accuracy of transcription but any obvious errors or omissions should be clarified with the author of the document.

## 2025-03-12 NOTE — ANESTHESIA PREPROCEDURE EVALUATION
03/12/2025  Wendy Matos is a 29 y.o., male.      H/h deepak johnson    Pre-op Assessment    I have reviewed the Patient Summary Reports.     I have reviewed the Nursing Notes. I have reviewed the NPO Status.   I have reviewed the Medications.     Review of Systems  Anesthesia Hx:  No problems with previous Anesthesia             Denies Family Hx of Anesthesia complications.    Denies Personal Hx of Anesthesia complications.                    Social:  Vaping       Cardiovascular:     Hypertension                                          Pulmonary:  Pulmonary Normal                       Musculoskeletal:  Arthritis   Ankylosing spondylitis            Endocrine:  Endocrine Normal                Physical Exam  General: Well nourished, Cooperative and Alert    Airway:  Mallampati: II   Mouth Opening: Normal  TM Distance: Normal  Tongue: Normal    Dental:  Intact    Chest/Lungs:  Normal Respiratory Rate    Heart:  Rate: Normal        Anesthesia Plan  Type of Anesthesia, risks & benefits discussed:    Anesthesia Type: Spinal  Intra-op Monitoring Plan: Standard ASA Monitors  Post Op Pain Control Plan: multimodal analgesia  Induction:  IV  Informed Consent: Informed consent signed with the Patient and all parties understand the risks and agree with anesthesia plan.  All questions answered. Patient consented to blood products? Yes  ASA Score: 2  Day of Surgery Review of History & Physical: H&P Update referred to the surgeon/provider.    Ready For Surgery From Anesthesia Perspective.     .

## 2025-03-12 NOTE — PLAN OF CARE
Problem: Physical Therapy  Goal: Physical Therapy Goal  Description: Pt will improve functional independence by performing:    Bed mobility: SBA  Sit to stand: SBA with rolling walker  Bed to chair t/f: SBA with Stand Step  with rolling walker  Ambulation x 200'  with SBA with rolling walker  1 Step (Curb): Min A  with rolling walker  10x4 Steps: Min A  with B HR  Independent with total hip HEP   Outcome: Progressing

## 2025-03-12 NOTE — PT/OT/SLP EVAL
Physical Therapy Evaluation    Patient Name:  Wendy Matos   MRN:  [id number]    Recommendations:     Discharge Recommendations: High Intensity Therapy (Low vs Highpending progress)   Discharge Equipment Recommendations: walker, rolling   Barriers to discharge:  inaccessible home (40 steps)    Assessment:     Wendy Matos is a 29 y.o. male admitted with a medical diagnosis of Primary osteoarthritis of right hip.  He presents with the following impairments/functional limitations: weakness, impaired endurance, impaired functional mobility, decreased lower extremity function, pain, decreased ROM, edema, orthopedic precautions .    Rehab Prognosis: Fair; patient would benefit from acute skilled PT services to address these deficits and reach maximum level of function.    Recent Surgery: Procedure(s) (LRB):  ROBOTIC ARTHROPLASTY, HIP, TOTAL, POSTERIOR APPROACH (Right) * Day of Surgery *    Plan:     During this hospitalization, patient to be seen BID to address the identified rehab impairments via gait training, therapeutic activities, therapeutic exercises and progress toward the following goals:    Plan of Care Expires:  03/18/25    Subjective     Chief Complaint: R hip pain  Patient/Family Comments/goals:   Pain/Comfort:  Location - Side 1: Right  Location 1: hip    Patients cultural, spiritual, Taoist conflicts given the current situation:      Living Environment:  Pt lives in third floor apartment with wife, 10 steps x4 to enter with landing every 10 steps.  Prior to admission, patients level of function was mod ind.  Equipment used at home: walker, rolling.  DME owned (not currently used): none.  Upon discharge, patient will have assistance from wife.    Objective:     Communicated with nurse prior to session.  Patient found supine with peripheral IV, telemetry  upon PT entry to room.    General Precautions: Standard, fall  Orthopedic Precautions:RLE posterior precautions, RLE partial weight bearing (50%)   Braces:  N/A  Respiratory Status: Room air    Exams:  RLE ROM: NT dt sx side  RLE Strength: NT dt sx side  LLE ROM: WFL  LLE Strength: WFL    Functional Mobility:  Bed Mobility:     Supine to Sit: contact guard assistance  Transfers:     Sit to Stand:  contact guard assistance with rolling walker  Gait: Pt ambulated 10 steps forward then became dizzy and required seated rest break, BP: 124/50,  with slight improvement in symptoms following rest break. Pt unsafe for further ambulation at this time        Treatment & Education:  Pt edu on total hip precautions, partial WB status and importance of frequent mobility  Pt completed prehab prior to sx    Patient left up in chair with all lines intact, call button in reach, nurse notified, and wife present.    GOALS:   Multidisciplinary Problems       Physical Therapy Goals          Problem: Physical Therapy    Goal Priority Disciplines Outcome Interventions   Physical Therapy Goal     PT, PT/OT Progressing    Description: Pt will improve functional independence by performing:    Bed mobility: SBA  Sit to stand: SBA with rolling walker  Bed to chair t/f: SBA with Stand Step  with rolling walker  Ambulation x 200'  with SBA with rolling walker  1 Step (Curb): Min A  with rolling walker  10x4 Steps: Min A  with B HR  Independent with total hip HEP                        DME Justifications:   Wendy's mobility limitation cannot be sufficiently resolved by the use of a cane. His functional mobility deficit can be sufficiently resolved with the use of a Rolling Walker. Patient's mobility limitation significantly impairs their ability to participate in one of more activities of daily living.  The use of a RW will significantly improve the patient's ability to participate in MRADLS and the patient will use it on regular basis in the home.    History:     Past Medical History:   Diagnosis Date    Ankylosing spondylitis     Arthritis     Hypertension        Past Surgical History:    Procedure Laterality Date    INJECTION OF JOINT Right 01/08/2025    Álvaro Mathias MD; Injection, Joint, Rt Hip;  no anesthesia       Time Tracking:     PT Received On:    PT Start Time: 1535     PT Stop Time: 1606  PT Total Time (min): 31 min     Billable Minutes: Evaluation 31      03/12/2025

## 2025-03-13 LAB
ANION GAP SERPL CALC-SCNC: 9 MEQ/L
BUN SERPL-MCNC: 11.7 MG/DL (ref 8.9–20.6)
CALCIUM SERPL-MCNC: 8.5 MG/DL (ref 8.4–10.2)
CHLORIDE SERPL-SCNC: 110 MMOL/L (ref 98–107)
CO2 SERPL-SCNC: 22 MMOL/L (ref 22–29)
CREAT SERPL-MCNC: 0.79 MG/DL (ref 0.72–1.25)
CREAT/UREA NIT SERPL: 15
ERYTHROCYTE [DISTWIDTH] IN BLOOD BY AUTOMATED COUNT: 13.5 % (ref 11.5–17)
GFR SERPLBLD CREATININE-BSD FMLA CKD-EPI: >60 ML/MIN/1.73/M2
GLUCOSE SERPL-MCNC: 124 MG/DL (ref 74–100)
HCT VFR BLD AUTO: 29.7 % (ref 42–52)
HGB BLD-MCNC: 9.9 G/DL (ref 14–18)
MCH RBC QN AUTO: 27.6 PG (ref 27–31)
MCHC RBC AUTO-ENTMCNC: 33.3 G/DL (ref 33–36)
MCV RBC AUTO: 82.7 FL (ref 80–94)
NRBC BLD AUTO-RTO: 0 %
PLATELET # BLD AUTO: 174 X10(3)/MCL (ref 130–400)
PMV BLD AUTO: 12.8 FL (ref 7.4–10.4)
POTASSIUM SERPL-SCNC: 3.8 MMOL/L (ref 3.5–5.1)
RBC # BLD AUTO: 3.59 X10(6)/MCL (ref 4.7–6.1)
SODIUM SERPL-SCNC: 141 MMOL/L (ref 136–145)
WBC # BLD AUTO: 8.17 X10(3)/MCL (ref 4.5–11.5)

## 2025-03-13 PROCEDURE — 25000003 PHARM REV CODE 250: Performed by: NURSE PRACTITIONER

## 2025-03-13 PROCEDURE — 97116 GAIT TRAINING THERAPY: CPT | Mod: CQ

## 2025-03-13 PROCEDURE — 94761 N-INVAS EAR/PLS OXIMETRY MLT: CPT

## 2025-03-13 PROCEDURE — 97530 THERAPEUTIC ACTIVITIES: CPT

## 2025-03-13 PROCEDURE — 80048 BASIC METABOLIC PNL TOTAL CA: CPT | Performed by: ORTHOPAEDIC SURGERY

## 2025-03-13 PROCEDURE — G0378 HOSPITAL OBSERVATION PER HR: HCPCS

## 2025-03-13 PROCEDURE — 25000003 PHARM REV CODE 250: Performed by: ORTHOPAEDIC SURGERY

## 2025-03-13 PROCEDURE — 97535 SELF CARE MNGMENT TRAINING: CPT

## 2025-03-13 PROCEDURE — 99900031 HC PATIENT EDUCATION (STAT)

## 2025-03-13 PROCEDURE — 36415 COLL VENOUS BLD VENIPUNCTURE: CPT | Performed by: ORTHOPAEDIC SURGERY

## 2025-03-13 PROCEDURE — 97166 OT EVAL MOD COMPLEX 45 MIN: CPT

## 2025-03-13 PROCEDURE — 63600175 PHARM REV CODE 636 W HCPCS: Mod: JZ,TB | Performed by: ORTHOPAEDIC SURGERY

## 2025-03-13 PROCEDURE — 85027 COMPLETE CBC AUTOMATED: CPT | Performed by: ORTHOPAEDIC SURGERY

## 2025-03-13 PROCEDURE — 94799 UNLISTED PULMONARY SVC/PX: CPT

## 2025-03-13 RX ORDER — KETOROLAC TROMETHAMINE 10 MG/1
10 TABLET, FILM COATED ORAL ONCE
Status: COMPLETED | OUTPATIENT
Start: 2025-03-13 | End: 2025-03-13

## 2025-03-13 RX ORDER — HYDROCODONE BITARTRATE AND ACETAMINOPHEN 7.5; 325 MG/1; MG/1
1 TABLET ORAL EVERY 4 HOURS PRN
Refills: 0 | Status: DISCONTINUED | OUTPATIENT
Start: 2025-03-13 | End: 2025-03-14 | Stop reason: HOSPADM

## 2025-03-13 RX ORDER — METOCLOPRAMIDE 10 MG/1
10 TABLET ORAL EVERY 6 HOURS
Status: DISCONTINUED | OUTPATIENT
Start: 2025-03-13 | End: 2025-03-13

## 2025-03-13 RX ORDER — METHOCARBAMOL 750 MG/1
750 TABLET, FILM COATED ORAL EVERY 6 HOURS PRN
Qty: 56 TABLET | Refills: 0 | Status: SHIPPED | OUTPATIENT
Start: 2025-03-13 | End: 2025-03-27

## 2025-03-13 RX ORDER — METHOCARBAMOL 750 MG/1
750 TABLET, FILM COATED ORAL EVERY 6 HOURS PRN
Status: DISCONTINUED | OUTPATIENT
Start: 2025-03-13 | End: 2025-03-14 | Stop reason: HOSPADM

## 2025-03-13 RX ORDER — HYDROCODONE BITARTRATE AND ACETAMINOPHEN 7.5; 325 MG/1; MG/1
1 TABLET ORAL EVERY 4 HOURS PRN
Qty: 42 TABLET | Refills: 0 | Status: SHIPPED | OUTPATIENT
Start: 2025-03-13 | End: 2025-03-20

## 2025-03-13 RX ORDER — HYDROCODONE BITARTRATE AND ACETAMINOPHEN 5; 325 MG/1; MG/1
1 TABLET ORAL EVERY 4 HOURS PRN
Refills: 0 | Status: DISCONTINUED | OUTPATIENT
Start: 2025-03-13 | End: 2025-03-14 | Stop reason: HOSPADM

## 2025-03-13 RX ORDER — AMOXICILLIN 250 MG
2 CAPSULE ORAL 2 TIMES DAILY
Qty: 56 TABLET | Refills: 0 | Status: SHIPPED | OUTPATIENT
Start: 2025-03-13 | End: 2025-03-27

## 2025-03-13 RX ORDER — NAPROXEN SODIUM 220 MG/1
81 TABLET, FILM COATED ORAL 2 TIMES DAILY
Qty: 84 TABLET | Refills: 0 | Status: SHIPPED | OUTPATIENT
Start: 2025-03-13 | End: 2025-04-24

## 2025-03-13 RX ORDER — METOCLOPRAMIDE 10 MG/1
10 TABLET ORAL EVERY 6 HOURS
Status: DISPENSED | OUTPATIENT
Start: 2025-03-13 | End: 2025-03-14

## 2025-03-13 RX ADMIN — DOCUSATE SODIUM 200 MG: 100 CAPSULE, LIQUID FILLED ORAL at 05:03

## 2025-03-13 RX ADMIN — GABAPENTIN 300 MG: 300 CAPSULE ORAL at 08:03

## 2025-03-13 RX ADMIN — METOCLOPRAMIDE HYDROCHLORIDE 10 MG: 5 INJECTION INTRAMUSCULAR; INTRAVENOUS at 02:03

## 2025-03-13 RX ADMIN — ACETAMINOPHEN 500 MG: 500 TABLET ORAL at 02:03

## 2025-03-13 RX ADMIN — HYDROCODONE BITARTRATE AND ACETAMINOPHEN 1 TABLET: 5; 325 TABLET ORAL at 12:03

## 2025-03-13 RX ADMIN — HYDROCODONE BITARTRATE AND ACETAMINOPHEN 1 TABLET: 5; 325 TABLET ORAL at 07:03

## 2025-03-13 RX ADMIN — METOCLOPRAMIDE HYDROCHLORIDE 10 MG: 5 INJECTION INTRAMUSCULAR; INTRAVENOUS at 10:03

## 2025-03-13 RX ADMIN — HYDROCODONE BITARTRATE AND ACETAMINOPHEN 1 TABLET: 5; 325 TABLET ORAL at 08:03

## 2025-03-13 RX ADMIN — METOCLOPRAMIDE 10 MG: 10 TABLET ORAL at 04:03

## 2025-03-13 RX ADMIN — CEFAZOLIN 2 G: 2 INJECTION, POWDER, FOR SOLUTION INTRAMUSCULAR; INTRAVENOUS at 05:03

## 2025-03-13 RX ADMIN — SENNOSIDES AND DOCUSATE SODIUM 2 TABLET: 50; 8.6 TABLET ORAL at 08:03

## 2025-03-13 RX ADMIN — KETOROLAC TROMETHAMINE 15 MG: 30 INJECTION, SOLUTION INTRAMUSCULAR at 05:03

## 2025-03-13 RX ADMIN — POLYETHYLENE GLYCOL 3350 17 G: 17 POWDER, FOR SOLUTION ORAL at 08:03

## 2025-03-13 RX ADMIN — FAMOTIDINE 20 MG: 20 TABLET, FILM COATED ORAL at 05:03

## 2025-03-13 RX ADMIN — HYDROCODONE BITARTRATE AND ACETAMINOPHEN 1 TABLET: 5; 325 TABLET ORAL at 04:03

## 2025-03-13 RX ADMIN — ACETAMINOPHEN 500 MG: 500 TABLET ORAL at 05:03

## 2025-03-13 RX ADMIN — KETOROLAC TROMETHAMINE 15 MG: 30 INJECTION, SOLUTION INTRAMUSCULAR at 12:03

## 2025-03-13 RX ADMIN — METHOCARBAMOL 750 MG: 750 TABLET ORAL at 05:03

## 2025-03-13 RX ADMIN — ASPIRIN 81 MG: 81 TABLET, CHEWABLE ORAL at 08:03

## 2025-03-13 RX ADMIN — KETOROLAC TROMETHAMINE 10 MG: 10 TABLET, FILM COATED ORAL at 12:03

## 2025-03-13 RX ADMIN — ASPIRIN 81 MG: 81 TABLET, CHEWABLE ORAL at 10:03

## 2025-03-13 RX ADMIN — SENNOSIDES AND DOCUSATE SODIUM 2 TABLET: 50; 8.6 TABLET ORAL at 10:03

## 2025-03-13 RX ADMIN — CEFAZOLIN 2 G: 2 INJECTION, POWDER, FOR SOLUTION INTRAMUSCULAR; INTRAVENOUS at 12:03

## 2025-03-13 NOTE — PLAN OF CARE
Problem: Occupational Therapy  Goal: Occupational Therapy Goal  Description: Pt will perform LB dressing standby with AE PRN by d/c.  Pt will perform toileting standby A by d/c.  Pt will perform toilet t/f standby A with LRAD by d/c.  Pt will perform tub t/f standby assist c TTB by d/c.  Pt will perform car t/f standby assist in adherence to pxns by d/c.   Outcome: Progressing

## 2025-03-13 NOTE — PT/OT/SLP EVAL
"Occupational Therapy   Evaluation    Name: Wendy Matos  MRN: [id number]  Admitting Diagnosis: Primary osteoarthritis of right hip  Recent Surgery: Procedure(s) (LRB):  ROBOTIC ARTHROPLASTY, HIP, TOTAL, POSTERIOR APPROACH (Right) 1 Day Post-Op    Recommendations:     Discharge Recommendations: Low Intensity Therapy  Discharge Equipment Recommendations:  walker, rolling  Barriers to discharge:  Inaccessible home environment    Assessment:     Wendy Matos is a 29 y.o. male with a medical diagnosis of Primary osteoarthritis of right hip. Performance deficits affecting function: weakness, impaired self care skills, impaired functional mobility, gait instability, decreased lower extremity function, decreased safety awareness, pain, decreased ROM, orthopedic precautions, impaired joint extensibility.      Rehab Prognosis: Fair; patient would benefit from acute skilled OT services to address these deficits and reach maximum level of function.       Plan:     Patient to be seen  (BID) to address the above listed problems via self-care/home management, therapeutic activities, therapeutic exercises  Plan of Care Expires: 03/19/25  Plan of Care Reviewed with: patient, spouse    Subjective     Chief Complaint: pain  Patient/Family Comments/goals: "My wife was having to help me at the very end with sock and shoes."    Occupational Profile:  Living Environment: lives with wife in 3rd floor apartment, has tub/shower combo with a grab bar available. 40 steps to enter (a landing after every 10 steps)  Previous level of function: modified independent with rolling walker for ambulation, partial assist for self care (mostly lower body)  Equipment Used at Home: walker, rolling  Assistance upon Discharge: wife    Pain/Comfort:  Pain Rating 1: 5/10  Location - Side 1: Right  Location - Orientation 1: generalized  Location 1: hip  Pain Addressed 1: Distraction, Reposition    Patients cultural, spiritual, Yarsanism conflicts given the current " situation: no    Objective:     Communicated with: NSG and PT prior to session.  Patient found supine with peripheral IV upon OT entry to room.    General Precautions: Standard, fall  Orthopedic Precautions: RLE posterior precautions, RLE partial weight bearing (50%)  Braces: N/A  Respiratory Status: Room air    Occupational Performance:    Bed Mobility:    Patient completed Scooting/Bridging with contact guard assistance  Patient completed Sit to Supine with minimum assistance    Functional Mobility/Transfers:  Patient completed Sit <> Stand Transfer with minimum assistance  with  rolling walker   Patient completed car transfer step transfer technique with CGA with RW.  Functional Mobility: Patient performed FM in hallway with RW and CGA, tolerated ~110 feet with no LOBs. Standing rest break taken at end, eventually sat in recliner for water break.     Activities of Daily Living:  Upper Body Dressing: set up A    Lower Body Dressing: minimum assistance and increased time to thread BLEs into shorts, OT instructed patient in technique for use of reacher to increase independence and compliance with posterior hip precautions. Pt also instructed in technique for doffing socks with reacher, donning socks with sock aid     Cognitive/Visual Perceptual:  Cognitive/Psychosocial Skills:     -       Oriented to: Person, Place, Time, and Situation   -       Follows Commands/attention:Follows one-step commands  -       Communication: clear/fluent  -       Memory: No Deficits noted  -       Safety awareness/insight to disability: impaired   -       Mood/Affect/Coping skills/emotional control: Appropriate to situation and Cooperative  Visual/Perceptual:      -Intact      Physical Exam:  Motor Planning: -       intact  Upper Extremity Range of Motion:     -       Right Upper Extremity: WFL  -       Left Upper Extremity: WFL  Upper Extremity Strength:    -       Right Upper Extremity: WFL  -       Left Upper Extremity:  WFL    Treatment & Education:  Pt and wife educated on roles and goals of occupational therapy, POC for acute stay. Pt also provided with long-handled sponge and long-handled shoe horn.   While sitting EOB, patient with internally rotated RLE, max verbal cues to refrain, pt with difficulty understanding.     Patient left ambulatory in room/wharton with all lines intact and Rafaela, PT present    GOALS:   Multidisciplinary Problems       Occupational Therapy Goals          Problem: Occupational Therapy    Goal Priority Disciplines Outcome Interventions   Occupational Therapy Goal     OT, PT/OT Progressing    Description: Pt will perform LB dressing standby with AE PRN by d/c.  Pt will perform toileting standby A by d/c.  Pt will perform toilet t/f standby A with LRAD by d/c.  Pt will perform tub t/f standby assist c TTB by d/c.  Pt will perform car t/f standby assist in adherence to pxns by d/c.                        DME Justifications:  No DME recommended requiring DME justifications    History:     Past Medical History:   Diagnosis Date    Ankylosing spondylitis     Arthritis     Hypertension          Past Surgical History:   Procedure Laterality Date    INJECTION OF JOINT Right 01/08/2025    Álvaro Mathias MD; Injection, Joint, Rt Hip;  no anesthesia    ROBOTIC ARTHROPLASTY,HIP,TOTAL,POSTERIOR APPROACH Right 3/12/2025    Procedure: ROBOTIC ARTHROPLASTY, HIP, TOTAL, POSTERIOR APPROACH;  Surgeon: Álvaro Mathias MD;  [facility];  Service: Orthopedics;  Laterality: Right;       Time Tracking:     OT Date of Treatment: 03/13/25  OT Start Time: 0900  OT Stop Time: 0939  OT Total Time (min): 39 min    Billable Minutes:Evaluation 30  Self Care/Home Management 9    3/13/2025

## 2025-03-13 NOTE — PT/OT/SLP PROGRESS
Physical Therapy Treatment    Patient Name:  Wendy Matos   MRN:  [id number]    Recommendations:     Discharge Recommendations: High Intensity Therapy (Low vs Highpending progress)  Discharge Equipment Recommendations: walker, rolling  Barriers to discharge: Inaccessible home and pain/medical    Assessment:     Wendy Matos is a 29 y.o. male admitted with a medical diagnosis of Primary osteoarthritis of right hip.  He presents with the following impairments/functional limitations: weakness, impaired endurance, impaired functional mobility, decreased lower extremity function, pain, decreased ROM, edema, orthopedic precautions .    Rehab Prognosis: Good; patient would benefit from acute skilled PT services to address these deficits and reach maximum level of function.    Recent Surgery: Procedure(s) (LRB):  ROBOTIC ARTHROPLASTY, HIP, TOTAL, POSTERIOR APPROACH (Right) 1 Day Post-Op    Plan:     During this hospitalization, patient to be seen BID to address the identified rehab impairments via gait training, therapeutic activities, therapeutic exercises and progress toward the following goals:    Plan of Care Expires:  03/18/25    Subjective     Chief Complaint: pain   Patient/Family Comments/goals: to get up multiple steps for home   Pain/Comfort:  Pain Rating 1: 6/10  Location - Side 1: Right  Location 1: hip  Pain Addressed 1: Pre-medicate for activity, Distraction, Reposition      Objective:     Communicated with nursing  prior to session.  Patient found HOB elevated with   upon PT entry to room.     General Precautions: Standard, fall  Orthopedic Precautions: RLE posterior precautions, RLE partial weight bearing (50%)  Braces: N/A  Respiratory Status: Room air     Functional Mobility:  Bed Mobility:     Supine to Sit: supervision and vc for tech and left hip pxs   Transfers:     Sit to Stand:  contact guard assistance and vc for tech and safety  with rolling walker  Bed to Chair: supervision with  rolling walker  using   Step Transfer  Gait: pt ambulated use of rw sba with vc for step length to improve gt fluency slow yuniel increased time 150ft  50% PWB maintained   Stairs:  Pt ascended/descended 9 stair(s) with No Assistive Device with right handrail with Stand-by Assistance and Contact Guard Assistance.     Treatment & Education:  Static standing balance for + void supervision     Patient left up in chair with call button in reach, wife  present, and BLE elevated pt hussein tx requires increased time with vc for safety with bed mob and hip pxs and managing steps pt able to manage 9 steps but states has 40 steps for home.  ..    GOALS:   Multidisciplinary Problems       Physical Therapy Goals          Problem: Physical Therapy    Goal Priority Disciplines Outcome Interventions   Physical Therapy Goal     PT, PT/OT Progressing    Description: Pt will improve functional independence by performing:    Bed mobility: SBA  Sit to stand: SBA with rolling walker  Bed to chair t/f: SBA with Stand Step  with rolling walker  Ambulation x 200'  with SBA with rolling walker  1 Step (Curb): Min A  with rolling walker MET  10x4 Steps: Min A  with B HR  Independent with total hip HEP                        DME Justifications:   Wendy's mobility limitation cannot be sufficiently resolved by the use of a cane. His functional mobility deficit can be sufficiently resolved with the use of a Rolling Walker. Patient's mobility limitation significantly impairs their ability to participate in one of more activities of daily living.  The use of a RW will significantly improve the patient's ability to participate in MRADLS and the patient will use it on regular basis in the home.    Time Tracking:     PT Received On: 03/13/25  PT Start Time: 1430     PT Stop Time: 1500  PT Total Time (min): 30 min     Billable Minutes: Gait Training 15min and Therapeutic Activity 15min    Treatment Type: Treatment  PT/PTA: PTA     Number of PTA visits since last PT visit: 1      03/13/2025

## 2025-03-13 NOTE — PLAN OF CARE
Problem: Physical Therapy  Goal: Physical Therapy Goal  Description: Pt will improve functional independence by performing:    Bed mobility: SBA  Sit to stand: SBA with rolling walker  Bed to chair t/f: SBA with Stand Step  with rolling walker  Ambulation x 200'  with SBA with rolling walker  1 Step (Curb): Min A  with rolling walker MET  Ramp: Min A with rolling walker MET  10x4 Steps: Min A  with B HR  Independent with total hip HEP   Outcome: Progressing

## 2025-03-13 NOTE — PT/OT/SLP PROGRESS
Physical Therapy Treatment    Patient Name:  Wendy Matos   MRN:  [id number]    Recommendations:     Discharge Recommendations: High Intensity Therapy (Low vs Highpending progress)  Discharge Equipment Recommendations: walker, rolling  Barriers to discharge:  inaccessible home    Assessment:     Wendy Matos is a 29 y.o. male admitted with a medical diagnosis of Primary osteoarthritis of right hip.  He presents with the following impairments/functional limitations: weakness, impaired endurance, impaired functional mobility, decreased lower extremity function, pain, decreased ROM, edema, orthopedic precautions .    Rehab Prognosis: Fair; patient would benefit from acute skilled PT services to address these deficits and reach maximum level of function.    Recent Surgery: Procedure(s) (LRB):  ROBOTIC ARTHROPLASTY, HIP, TOTAL, POSTERIOR APPROACH (Right) 1 Day Post-Op    Plan:     During this hospitalization, patient to be seen BID to address the identified rehab impairments via gait training, therapeutic activities, therapeutic exercises and progress toward the following goals:    Plan of Care Expires:  03/18/25    Subjective     Chief Complaint: R hip pain  Patient/Family Comments/goals:   Pain/Comfort:  Location - Side 1: Right  Location 1: hip      Objective:     Communicated with nurse prior to session.  Patient found ambulatory in room/wharton with OT with peripheral IV, telemetry upon PT entry to room.     General Precautions: Standard, fall  Orthopedic Precautions: RLE posterior precautions, RLE partial weight bearing (50%)  Braces: N/A  Respiratory Status: Room air     Functional Mobility:  Transfers:     Sit to Stand:  contact guard assistance with rolling walker;pt reeducated on proper body mechanics with sitting with demonstration from therapist; prior to edu, pt sat by pushing hips forward to avoid flexion at hips and nearly missing chair when attempting to sit. Pt performed well following edu  Gait: Pt ambulated 50  "ft, 200 ft w rw and CGA, pt required frequent cues during ambulation for proper WB with majority of weight placed into Ues to maintain WB status. Pt demonstrates tightness of hip flexors of RLE  Stairs:  Pt ascended/descended 9 stair(s) and 4" curb step with Rolling Walker with right handrail with Contact Guard Assistance.  Pt edu on facing R HR to avoid twisting of R hip and remaining on R toes to maintain 50% WB precautions, pt demonstrated understanding and performed well but became quickly fatigued following 9 steps. Pt unable to utilize jelani HR due to wide steps at apartment  Ramp: contact guard assistance with rolling walker          Treatment & Education:  Pt edu on total hip precautions, 50% PWB, and importance of frequent mobility   Pt completed seated AQUILES therex x10 AAROM    Patient left up in chair with all lines intact, call button in reach, nurse notified, and wife present..    GOALS:   Multidisciplinary Problems       Physical Therapy Goals          Problem: Physical Therapy    Goal Priority Disciplines Outcome Interventions   Physical Therapy Goal     PT, PT/OT Progressing    Description: Pt will improve functional independence by performing:    Bed mobility: SBA  Sit to stand: SBA with rolling walker  Bed to chair t/f: SBA with Stand Step  with rolling walker  Ambulation x 200'  with SBA with rolling walker  1 Step (Curb): Min A  with rolling walker MET  10x4 Steps: Min A  with B HR  Independent with total hip HEP                        DME Justifications:   Wendy's mobility limitation cannot be sufficiently resolved by the use of a cane. His functional mobility deficit can be sufficiently resolved with the use of a Rolling Walker. Patient's mobility limitation significantly impairs their ability to participate in one of more activities of daily living.  The use of a RW will significantly improve the patient's ability to participate in MRADLS and the patient will use it on regular basis in the " home.    Time Tracking:     PT Received On:    PT Start Time: 0936     PT Stop Time: 1006  PT Total Time (min): 30 min     Billable Minutes: Gait Training 15 and Therapeutic Activity 15    Treatment Type: Treatment  PT/PTA: PT     Number of PTA visits since last PT visit: 0     03/13/2025

## 2025-03-13 NOTE — PT/OT/SLP PROGRESS
Occupational Therapy   Treatment    Name: Wendy Matos  MRN: [id number]  Admitting Diagnosis:  Primary osteoarthritis of right hip  1 Day Post-Op    Recommendations:     Discharge Recommendations: Low Intensity Therapy  Discharge Equipment Recommendations:  walker, rolling  Barriers to discharge:  Inaccessible home environment    Assessment:     Wendy Matos is a 29 y.o. male with a medical diagnosis of Primary osteoarthritis of right hip. Performance deficits affecting function are weakness, impaired endurance, pain, orthopedic precautions, impaired functional mobility.     Rehab Prognosis:  Good; patient would benefit from acute skilled OT services to address these deficits and reach maximum level of function.       Plan:     Patient to be seen  (BID) to address the above listed problems via self-care/home management, therapeutic activities, therapeutic exercises  Plan of Care Expires: 03/19/25  Plan of Care Reviewed with: patient, spouse    Subjective     Chief Complaint: No complaints at this time  Pain/Comfort:  Pain Rating 1: 7/10  Location - Side 1: Right  Location 1: hip  Pain Addressed 1: Pre-medicate for activity, Reposition    Objective:     Communicated with: RN prior to session.  Patient found up in chair with peripheral IV upon OT entry to room.    General Precautions: Standard, fall    Orthopedic Precautions:RLE partial weight bearing, RLE posterior precautions (50%)  Braces: N/A  Respiratory Status: Room air     Occupational Performance:     Functional Mobility/Transfers:  Patient completed Sit <> Stand Transfer with stand by assistance  with  rolling walker   Pt noted to exaggerate trunk extension when completing sit <> stands. OT educated pt on hip precautions and demonstrated how to complete sit <> stands without having to exaggerate trunk extension. Pt voiced agreement and understanding.  Functional Mobility: Pt ambulated from room 335 > car simulator (~170') with RW and SBA. No LOB noted  throughout  Pt completed a car transfer with SBA while adhering to 3/3 hip precautions. Pt did require increased time to complete transfer.  Pt unable to tolerate tub transfer at this time due to increased pain. Pt voiced he will likely not take a shower and complete sponge baths following d/c home with the assistance from his wife. OT demonstrated how to safely transfer in/out tub with use of RW while adhering to hip precautions. Pt voiced understanding. Pt would benefit from practicing transfer prior to d/c.    Treatment & Education:    Patient left up in chair with call button in reach    GOALS:   Multidisciplinary Problems       Occupational Therapy Goals          Problem: Occupational Therapy    Goal Priority Disciplines Outcome Interventions   Occupational Therapy Goal     OT, PT/OT Progressing    Description: Pt will perform LB dressing standby with AE PRN by d/c.  Pt will perform toileting standby A by d/c.  Pt will perform toilet t/f standby A with LRAD by d/c.  Pt will perform tub t/f standby assist c TTB by d/c.  Pt will perform car t/f standby assist in adherence to pxns by d/c.                        Time Tracking:     OT Date of Treatment: 03/13/25  OT Start Time: 1505  OT Stop Time: 1530  OT Total Time (min): 25 min    Billable Minutes:Therapeutic Activity 25    OT/FREDA: OT          3/13/2025

## 2025-03-13 NOTE — PLAN OF CARE
03/13/25 1434   Discharge Assessment   Assessment Type Discharge Planning Assessment   Source of Information patient;family   Does patient/caregiver understand observation status Yes   Communicated FRANCISCO with patient/caregiver Yes   Reason For Admission S/P THR   People in Home spouse   Do you expect to return to your current living situation? Yes   Do you have help at home or someone to help you manage your care at home? Yes   Who are your caregiver(s) and their phone number(s)? JANIYA HART [phone]   Prior to hospitilization cognitive status: Alert/Oriented   Current cognitive status: Alert/Oriented   Walking or Climbing Stairs Difficulty yes   Walking or Climbing Stairs ambulation difficulty, requires equipment;stair climbing difficulty, assistance 1 person;stair climbing difficulty, requires equipment   Mobility Management apartment has 40 exterior stairs   Dressing/Bathing Difficulty yes   Dressing/Bathing bathing difficulty, requires equipment;dressing difficulty, assistance 1 person;dressing difficulty, requires equipment   Do you have any problems with: Errands/Grocery;Needs other help   Home Accessibility stairs to enter home   Number of Stairs, Main Entrance other (see comments)   Stair Railings, Main Entrance railings safe and in good condition   Equipment Currently Used at Home none   Readmission within 30 days? No   Patient currently being followed by outpatient case management? No   Do you currently have service(s) that help you manage your care at home? No   Do you take prescription medications? Yes   Do you have prescription coverage? Yes   Do you have any problems affording any of your prescribed medications? No   Is the patient taking medications as prescribed? yes   Who is going to help you get home at discharge? transportation / Uber   How do you get to doctors appointments? family or friend will provide;public transportation   Are you on dialysis? No   Do you take coumadin? No    Discharge Plan A Home with family;Home Health   Discharge Plan B Home with family;Home Health   DME Needed Upon Discharge  walker, rolling;raised toilet   Discharge Plan discussed with: Patient;Spouse/sig other   Transition of Care Barriers Mobility;Transportation   Physical Activity   On average, how many days per week do you engage in moderate to strenuous exercise (like a brisk walk)? 2 days   On average, how many minutes do you engage in exercise at this level? 30 min   Financial Resource Strain   How hard is it for you to pay for the very basics like food, housing, medical care, and heating? Not very   Housing Stability   In the last 12 months, was there a time when you were not able to pay the mortgage or rent on time? N   At any time in the past 12 months, were you homeless or living in a shelter (including now)? N   Transportation Needs   Has the lack of transportation kept you from medical appointments, meetings, work or from getting things needed for daily living? Yes, it has kept me from non-medical meetings, appointments, work or from getting things that I need.   Food Insecurity   Within the past 12 months, you worried that your food would run out before you got the money to buy more. Never true   Within the past 12 months, the food you bought just didn't last and you didn't have money to get more. Never true   Stress   Do you feel stress - tense, restless, nervous, or anxious, or unable to sleep at night because your mind is troubled all the time - these days? Not at all   Social Isolation   How often do you feel lonely or isolated from those around you?  Never   Alcohol Use   Q1: How often do you have a drink containing alcohol? Pt Declined   Q2: How many drinks containing alcohol do you have on a typical day when you are drinking? Pt Declined   Q3: How often do you have six or more drinks on one occasion? Pt Declined   [employer]   In the past 12 months has the electric, gas, oil, or water company  threatened to shut off services in your home? No   Health Literacy   How often do you need to have someone help you when you read instructions, pamphlets, or other written material from your doctor or pharmacy? Rarely     S/p THR; ORIF femur. 50% WB.  Spk w patient and wife, Marge @ bedside -- std pt/wife live on 3rd fl of apartment (?[facility]) without elevator access. wife to asst w homecare, but does not drive. Pt will use Uber for transport to/from outpt therapy. Pt has RW, but needs elev toilet seat. Discuss dcp options. Pt agreed to hh x 3 wks ( due to transportation access & difficulty of 40 exterior stairs), then transition to outpt therapy. Provider list given. Foc obtained.   Called/faxed referral for hh to [facility]. Plan discharge tomorrow.   Called/faxed referral for outpt therapy to [facility] ( [facility])-- to start in 3 weeks. They will contact pt with appointment date & time.  Called/faxed referral for elev toilet seat to [facility]. Pt aware that its OOP.  They will deliver to hospital.   Pcp: Dr Benson   Contact # pt [phone]; wife - [phone]      Patient DID participate in a pre-op exercise regimen

## 2025-03-13 NOTE — DISCHARGE INSTRUCTIONS
[facility]  [facility] [facility] [address]  [place] [address]   Fax [phone]  SURGEON: Álvaro Mathias MD  Phone Number: [phone]    After discharge, all questions or concerns should be handled at your surgeon's office ([phone]). If it is a weekend or after hours, you will get the surgeon on call.     Discharge Medications:    PAIN MANAGEMENT: Next Dose Available   Celebrex/Celecoxib (Anti-Inflammatory) - ok to restart home regimen 3/14/2025 at anytime.   Robaxin/Methocarbamol 750mg (Muscle Relaxer) - Every 6-8 hours AS NEEDED for muscle spasms, thigh pain or additional pain control 3/14/2025 at 10:30pm.   Norco 7.5/325mg (Hydrocodone/Acetaminophen) (Pain Med) - every 4-6 hours AS NEEDED for pain 3/14/2025 at 6:40pm.   COMPLICATION PREVENTION MEDS: Next Dose DUE   Aspirin 81mg twice a day for 6 weeks post-op for blood clot prevention PM on 3/14/2025   Miralax 17gm or Senokot S/Christin-Colace 8.6/50mg 2 tablets - once or twice a day while on narcotics and muscle relaxers for constipation prevention PM on 3/14/2025   NOZIN NASAL  - twice a day for 2 weeks or until supply runs out, whichever comes first (Infection prevention) PM on 3/14/2025   Take a medication once or twice a day while taking Celebrex and Aspirin at the same time to prevent heartburn PM on 3/14/2025     Total Hip Replacement                                                                                                                                  PAIN MEDICATIONS/PAIN MANAGEMENT: (Use the medication log in your discharge packet to keep track of your medications)  Ok to restart your home dose of Celebrex/Celecoxib (anti-inflammatory), as you were taking prior to surgery.   While on Celebrex and Aspirin (blood thinner) at the same time, take a medication once or twice a day to protect your stomach (for the next 10 days) (Examples: Nexium, Prilosec, Prevacid, Omeprazole, Pepcid...etc). If you start  having intolerable stomach issues, discontinue the Celebrex completely.   Aside from Celebrex, No other anti-inflammatories (Ibuprofen, Aleve, Motrin, Naproxen, Mobic/Meloxicam, Toradol/Ketorolac, Diclofenac/Voltaren...etc) for 2 weeks or until the wound is healed.    Norco 7.5/325mg (Hydrocodone/Acetaminophen) (pain pill) - You can take 1 tablet every 4-6 hours for pain. If the pain is mild, take 1/2 of a pill. Once you start taking a half of a pain pill, you can take a Tylenol 325mg with each dose for a little extra pain relief without side effects. Gradually decrease the use as the pain lessens. As you decrease the Norco, increase the Tylenol.      **NO MORE THAN 3000mg OF TYLENOL IN 24 HOURS**.     Robaxin/Methocarbamol 750mg (muscle relaxer)- you can take every 6-8 hours as needed for muscle spasms, thigh pain and stiffness, additional pain control or breakthrough pain medications. This medication is helpful for pain control while lessening your need for narcotics. Please reduce the use gradually as the pain and spasms lessen. DO NOT TAKE AT THE SAME TIME AS A PAIN PILL. YOU WILL BE BETTER SERVED WITH 2 HOURS BETWEEN PAIN PILL AND MUSCLE RELAXER.     **Other things that help with pain control is WALKING, COMPRESSION WRAP, ICE and ELEVATION!!**    BLOOD CLOT PREVENTION:   Aspirin 81mg (blood thinner) - twice a day for 6 weeks for blood clot prevention. Start on the evening of 3/14/2025  Stop on 4/24/2025.  You need to continuing wearing your compression stocking (SANTOS Hose - ThromboEmbolic Disease Prevention Device) for the next 2-6 weeks post-op. It is ok to remove them for hygiene and at bedtime.   Hand wash and Dry. **If the swelling persists in the legs after you stop wearing the Santos hose, continue to wear them until the swelling decreases.**  REMOVE STOCKINGS AT LEAST DAILY FOR SKIN ASSESSMENT.   Do NOT let the stockings roll down, creating a tourniquet around the back of your knee. If you need to, leave the  excess at the bottom of the stocking.   The best thing you can do to prevent blood clots is to walk around as much as possible, AT LEAST EVERY 1-2 HOURS.       CONSTIPATION PREVENTION:   Miralax or Senokot S/Christin-Colace and Stool softeners EVERY DAY while on pain meds.  Use other more aggressive over the counter LAXATIVES as needed for constipation (Examples: Milk of Magnesia, Dulcolax tabs or suppository, Magnesium Citrate, Fleet's Enema...etc.)   Drink lots of water.  Increase Fiber in diet.  Increase walking distance each day  DO NOT GO MORE THAN 2 DAYS WITHOUT HAVING A BOWEL MOVEMENT!    ACTIVITY:   Weight bearing precautions as follows: 75% PARTIAL WEIGHT BEARING to operative leg with walker.   HIP PRECAUTIONS:  NO Twisting  NO Leaning past 90 degrees  NO Crossing legs    DO NOT TAKE YOURSELF OFF OF THE WALKER TOO SOON. ALLOW YOUR OUTPATIENT THERAPIST or SURGEON TO GUIDE YOU.   Change positions often throughout the day.   Walk around at least every 1-2 hours while awake.   No heavy lifting, pulling, pushing or straining.  Ice the Hip and thigh AS MUCH AS POSSIBLE  Home Health Physical Therapy at least 3 times per week    WOUND CARE:     Dry dressing changes every other day and as needed for soiling for 14 days. Start Sunday. You may need to change the dressing daily if the wound is draining. Switch to every other day as soon as possible. NOTIFY MD OF EXCESSIVE WOUND DRAINAGE.   May wet incision AFTER 2 weeks- (after you follow-up with your surgeon).     DO NOT WET WOUND or apply any ointments, creams, lotions or antiseptics.  Ace wrap - apply your compression stocking and apply the ace wrap where the stocking stops for extra added compression to the knee and thigh.   Ok to shower before then if able to keep wound from getting wet (plastic barrier, saran wrap or cling wrap and tape).   DO NOT TOUCH INCISION      URINARY RETENTION:  If you start having difficulty urinating, decrease the use of Pain pills and  muscle relaxers and notify your primary care doctor.     PNEUMONIA PREVENTION:  Stay out of bed as much as possible and walk around every 1-2 hours.  Continue breathing exercises (Incentive Spirometry) every 1-2 hours while mobility is limited and while you are on pain pills.    FALL PREVENTION:  Wear sturdy shoes that fit well - Wearing shoes with high heels or slippery soles, or shoes that are too loose, can lead to falls. Walking around in bare feet, or only socks, can also increase your risk of falling.  Use walker as long as your surgeon and therapist recommend it  Use good lighting and  throw rugs, electrical cords, furniture and clutter (anything than can cause you to trip at home.   Non-slip rug in bathroom or shower    INFECTION PREVENTION:  NOZIN ANTISEPTIC NASAL  - twice a day for 2 weeks or until supply runs out, whichever comes first. Shake bottle well, saturate cotton swab with 4 drops of antiseptic solution. Swab right nostril rim 6-8 times clockwise and counterclockwise. Take swab out, apply 2 more drops then swab left nostril rim 6-8 times clockwise and counterclockwise.   Proper handwashing before and after dressing changes. Do not wet the wound. Wound care instructions as written above. NOTIFY MD OF EXCESSIVE WOUND DRAINAGE.  No alcohol, smoking or tobacco products  Pets should not be allowed around the wound or the dressing.   Treat UTI and skin infections as soon as possible.  Pre-medicate with antibiotics prior to dental or surgical procedures.   If you are diabetic, MAINTAIN GOOD BLOOD SUGAR CONTROL (Below 150) DURING YOUR RECOVERY. If you see high numbers, notify your primary care doctor.     Call your SURGEON'S OFFICE ([phone]) if you experience the following signs and symptoms of infection:   Unusual redness, swelling, excessive, cloudy or foul smelling drainage at the incision site.   Persistent low grade temp OR a temp greater than 102 F, unrelieved by Tylenol  Pain at  "surgical site, unrelieved by pain meds    Warning signs of a blood clot in your leg: (CALL YOUR SURGEON)  New onset or increasing pain in calf, new onset tenderness or redness above or below the knee or increasing swelling of your calf, ankle, or foot.  Warning signs that a blood clot has traveled to your lungs: (REPORT TO THE ER/CALL 911)  Sudden or increase in Shortness of breath, sudden onset of chest pains, or  Localized chest pain with coughing.       IF ANY ISSUES ARISE AND YOU FEEL THE NEED TO CALL YOUR PRIMARY CARE DOCTOR, PLEASE LET YOUR SURGEON KNOW AS WELL.     For emergencies, please report to OUR ([facility] or [facility] main [place]) Emergency department and tell them to call YOUR SURGEON at [phone].     BEFORE MAKING ANY CHANGES TO THE MEDICAL CARE PLAN OR GOING TO THE EMERGENCY ROOM, PLEASE CONTACT THE SURGEON.    After discharge, all questions or concerns should be handled at your surgeon's office ([phone]). If it is a weekend or after hours, you will get the surgeon on call.     Praveena Patel RN, nurse navigator, available for questions or issues after discharge at [phone].        Patient Education       Total Hip Replacement Discharge Instructions   About this topic   The hip joint is a "ball and socket" joint. The "ball" part of the joint is the top part of the thigh bone. It is the femoral head. The "socket" is a part of the pelvic bone. The ball fits into the socket in the pelvic bone called the acetabulum. Cartilage covers the parts of the joint in a normal hip. This lets the hip glide easily. The cartilage can become worn and cause bone to rub on other bone. This condition is called osteoarthritis or arthritis. This often leads to pain, stiffness, and trouble walking. Sometimes, drugs and exercises can help you with the pain. When they stop working, you may need hip jointreplacement surgery.       American Academy of Orthopaedic " Surgeons  https://orthoinfo.aaos.org/en/treatment/total-hip-replacement/   American Physical Therapy Association  https://www.choosept.com/symptomsconditionsdetail/physical-therapy-guide-to-total-hip-replacement-arthroplasty   NHS Choices  https://www.nhs.uk/conditions/hip-replacement/   Last Reviewed Date   2020-08-24  Consumer Information Use and Disclaimer   This information is not specific medical advice and does not replace information you receive from your health care provider. This is only a brief summary of general information. It does NOT include all information about conditions, illnesses, injuries, tests, procedures, treatments, therapies, discharge instructions or life-style choices that may apply to you. You must talk with your health care provider for complete information about your health and treatment options. This information should not be used to decide whether or not to accept your health care providers advice, instructions or recommendations. Only your health care provider has the knowledge and trainingto provide advice that is right for you.  Copyright   Copyright © 2021 [employer], Inc. and its affiliates and/or licensors. All rightsreserved.

## 2025-03-13 NOTE — PROGRESS NOTES
"No acute events overnight.  Pain controlled.  Resting in bed.     Vital Signs  Temp: 98.1 °F (36.7 °C)  Temp Source: Oral  Pulse: 92  Heart Rate Source: Monitor  Resp: 20  SpO2: 98 %  Pulse Oximetry Type: Continuous  Flow (L/min) (Oxygen Therapy): 10  Device (Oxygen Therapy): room air  BP: (!) 104/57  BP Location: Right arm  BP Method: Automatic  Patient Position: Sitting  Height and Weight  Height: 5' 9" (175.3 cm)  Height Method: Stated  Weight: 90.7 kg (200 lb)  Weight Method: Standard Scale  BSA (Calculated - sq m): 2.1 sq meters  BMI (Calculated): 29.5  Weight in (lb) to have BMI = 25: 168.9]    +FHL/EHL  BCR distally  Dressing c/d/i  SILT distally    Recent Lab Results         03/13/25  0408   03/12/25  1148   03/12/25  1146   03/12/25  0810        Anion Gap 9.0             BUN 11.7             BUN/CREAT RATIO 15             Calcium 8.5             Chloride 110             CO2 22             Creatinine 0.79             eGFR >60  Comment: Estimated GFR calculated using the CKD-EPI creatinine (2021) equation.             Glucose 124             Hematocrit 29.7   36.7           Hemoglobin 9.9   12.3           MCH 27.6             MCHC 33.3             MCV 82.7             MPV 12.8             nRBC 0.0             Platelet Count 174             POCT Glucose     184   145       Potassium 3.8             RBC 3.59             RDW 13.5             Sodium 141             WBC 8.17                     A/P:  Status post AQUILES with periprosthetic fx  Pain controlled  Overall patient doing well.  Therapy for mobility and ambulation.  DVT PPx  50% WB  Home later today vs tomorrow pending mobility  "

## 2025-03-14 VITALS
RESPIRATION RATE: 18 BRPM | WEIGHT: 200 LBS | BODY MASS INDEX: 29.62 KG/M2 | HEART RATE: 97 BPM | SYSTOLIC BLOOD PRESSURE: 144 MMHG | DIASTOLIC BLOOD PRESSURE: 85 MMHG | TEMPERATURE: 99 F | HEIGHT: 69 IN | OXYGEN SATURATION: 99 %

## 2025-03-14 LAB
ANION GAP SERPL CALC-SCNC: 10 MEQ/L
BUN SERPL-MCNC: 9.3 MG/DL (ref 8.9–20.6)
CALCIUM SERPL-MCNC: 8.6 MG/DL (ref 8.4–10.2)
CHLORIDE SERPL-SCNC: 105 MMOL/L (ref 98–107)
CO2 SERPL-SCNC: 24 MMOL/L (ref 22–29)
CREAT SERPL-MCNC: 0.75 MG/DL (ref 0.72–1.25)
CREAT/UREA NIT SERPL: 12
ERYTHROCYTE [DISTWIDTH] IN BLOOD BY AUTOMATED COUNT: 13.6 % (ref 11.5–17)
GFR SERPLBLD CREATININE-BSD FMLA CKD-EPI: >60 ML/MIN/1.73/M2
GLUCOSE SERPL-MCNC: 118 MG/DL (ref 74–100)
HCT VFR BLD AUTO: 29.9 % (ref 42–52)
HGB BLD-MCNC: 10 G/DL (ref 14–18)
MCH RBC QN AUTO: 27.9 PG (ref 27–31)
MCHC RBC AUTO-ENTMCNC: 33.4 G/DL (ref 33–36)
MCV RBC AUTO: 83.3 FL (ref 80–94)
NRBC BLD AUTO-RTO: 0 %
PLATELET # BLD AUTO: 159 X10(3)/MCL (ref 130–400)
PMV BLD AUTO: 12.1 FL (ref 7.4–10.4)
POTASSIUM SERPL-SCNC: 3.6 MMOL/L (ref 3.5–5.1)
RBC # BLD AUTO: 3.59 X10(6)/MCL (ref 4.7–6.1)
SODIUM SERPL-SCNC: 139 MMOL/L (ref 136–145)
WBC # BLD AUTO: 8.69 X10(3)/MCL (ref 4.5–11.5)

## 2025-03-14 PROCEDURE — 36415 COLL VENOUS BLD VENIPUNCTURE: CPT | Performed by: ORTHOPAEDIC SURGERY

## 2025-03-14 PROCEDURE — 25000003 PHARM REV CODE 250: Performed by: ORTHOPAEDIC SURGERY

## 2025-03-14 PROCEDURE — 97530 THERAPEUTIC ACTIVITIES: CPT

## 2025-03-14 PROCEDURE — G0378 HOSPITAL OBSERVATION PER HR: HCPCS

## 2025-03-14 PROCEDURE — 85027 COMPLETE CBC AUTOMATED: CPT | Performed by: ORTHOPAEDIC SURGERY

## 2025-03-14 PROCEDURE — 97116 GAIT TRAINING THERAPY: CPT

## 2025-03-14 PROCEDURE — 80048 BASIC METABOLIC PNL TOTAL CA: CPT | Performed by: ORTHOPAEDIC SURGERY

## 2025-03-14 PROCEDURE — 97535 SELF CARE MNGMENT TRAINING: CPT

## 2025-03-14 PROCEDURE — 25000003 PHARM REV CODE 250: Performed by: NURSE PRACTITIONER

## 2025-03-14 RX ADMIN — ASPIRIN 81 MG: 81 TABLET, CHEWABLE ORAL at 09:03

## 2025-03-14 RX ADMIN — HYDROCODONE BITARTRATE AND ACETAMINOPHEN 1 TABLET: 7.5; 325 TABLET ORAL at 10:03

## 2025-03-14 RX ADMIN — FAMOTIDINE 20 MG: 20 TABLET, FILM COATED ORAL at 06:03

## 2025-03-14 RX ADMIN — METHOCARBAMOL 750 MG: 750 TABLET ORAL at 09:03

## 2025-03-14 RX ADMIN — SENNOSIDES AND DOCUSATE SODIUM 2 TABLET: 50; 8.6 TABLET ORAL at 09:03

## 2025-03-14 RX ADMIN — DOCUSATE SODIUM 200 MG: 100 CAPSULE, LIQUID FILLED ORAL at 06:03

## 2025-03-14 RX ADMIN — HYDROCODONE BITARTRATE AND ACETAMINOPHEN 1 TABLET: 5; 325 TABLET ORAL at 12:03

## 2025-03-14 RX ADMIN — HYDROCODONE BITARTRATE AND ACETAMINOPHEN 1 TABLET: 5; 325 TABLET ORAL at 06:03

## 2025-03-14 RX ADMIN — HYDROCODONE BITARTRATE AND ACETAMINOPHEN 1 TABLET: 5; 325 TABLET ORAL at 02:03

## 2025-03-14 RX ADMIN — METHOCARBAMOL 750 MG: 750 TABLET ORAL at 05:03

## 2025-03-14 NOTE — PLAN OF CARE
Problem: Adult Inpatient Plan of Care  Goal: Plan of Care Review  Outcome: Progressing  Flowsheets (Taken 3/14/2025 5209)  Plan of Care Reviewed With:   patient   spouse  Goal: Optimal Comfort and Wellbeing  Outcome: Progressing     Problem: Wound  Goal: Absence of Infection Signs and Symptoms  Outcome: Progressing  Goal: Optimal Pain Control and Function  Outcome: Progressing  Goal: Optimal Wound Healing  Outcome: Progressing     Problem: Infection  Goal: Absence of Infection Signs and Symptoms  Outcome: Progressing     Problem: Hip Arthroplasty  Goal: Acceptable Pain Control  Outcome: Progressing     Problem: Fall Injury Risk  Goal: Absence of Fall and Fall-Related Injury  Outcome: Progressing

## 2025-03-14 NOTE — NURSING
Pt discharged home with family and  services, pt and wife reviewed post op discharge instructions and understanding verbalized, pt dressing changed to coverlet island dressings and given extra bandages for wound care, pt received prescriptions as well, no distress noted upon discharge.     3/14/2025   6:06 PM    Nurse Note:       Prior to discharge, the Patient/Support Person was educated and was able to verbalize understanding on following:    [x] Yes   [] No   [] Further Education Provided    Hip ReplacementSpecific Education   Pain management, Medication Management and Prescriptions  Activity level  Orthopedic precautions/braces - Hip Precautions  Complication Prevention to include: Constipation, post-op urinary retention, pneumonia, bleeding, falls, infection, DVT prophylaxis and nausea vomiting  Wound care Instructions/Bandages provided  Next dose due for pain and complication prevention medications      Perception of Care - Joint Replacement Population Total Joint Surgery List: HIP    Patient able to verbalize one way to treat/prevent SWELLING at home   [x] Yes   [] No   [] Further Education Provided      Attending Nurse:  Bouchra

## 2025-03-14 NOTE — PLAN OF CARE
Problem: Adult Inpatient Plan of Care  Goal: Plan of Care Review  Outcome: Progressing  Goal: Patient-Specific Goal (Individualized)  Outcome: Progressing  Goal: Absence of Hospital-Acquired Illness or Injury  Outcome: Progressing  Goal: Optimal Comfort and Wellbeing  Outcome: Progressing  Goal: Readiness for Transition of Care  Outcome: Progressing     Problem: Wound  Goal: Optimal Coping  Outcome: Progressing  Goal: Optimal Functional Ability  Outcome: Progressing  Goal: Absence of Infection Signs and Symptoms  Outcome: Progressing  Goal: Improved Oral Intake  Outcome: Progressing  Goal: Optimal Pain Control and Function  Outcome: Progressing  Goal: Skin Health and Integrity  Outcome: Progressing  Goal: Optimal Wound Healing  Outcome: Progressing     Problem: Infection  Goal: Absence of Infection Signs and Symptoms  Outcome: Progressing     Problem: Hip Arthroplasty  Goal: Optimal Coping  Outcome: Progressing  Goal: Absence of Bleeding  Outcome: Progressing  Goal: Effective Bowel Elimination  Outcome: Progressing  Goal: Fluid and Electrolyte Balance  Outcome: Progressing  Goal: Optimal Functional Ability  Outcome: Progressing  Goal: Absence of Infection Signs and Symptoms  Outcome: Progressing  Goal: Intact Neurovascular Status  Outcome: Progressing  Goal: Anesthesia/Sedation Recovery  Outcome: Progressing  Goal: Acceptable Pain Control  Outcome: Progressing  Goal: Nausea and Vomiting Relief  Outcome: Progressing  Goal: Effective Urinary Elimination  Outcome: Progressing  Goal: Effective Oxygenation and Ventilation  Outcome: Progressing     Problem: Fall Injury Risk  Goal: Absence of Fall and Fall-Related Injury  Outcome: Progressing

## 2025-03-14 NOTE — PLAN OF CARE
Problem: Occupational Therapy  Goal: Occupational Therapy Goal  Description: Pt will perform LB dressing standby with AE PRN by d/c.  Pt will perform toileting standby A by d/c.  Pt will perform toilet t/f standby A with LRAD by d/c.  Pt will perform tub t/f standby assist c TTB by d/c. MET  Pt will perform car t/f standby assist in adherence to pxns by d/c. MET  Outcome: Progressing

## 2025-03-14 NOTE — PT/OT/SLP PROGRESS
"Occupational Therapy   Treatment    Name: Wendy Matos  MRN: [id number]  Admitting Diagnosis:  Primary osteoarthritis of right hip  2 Days Post-Op    Recommendations:     Discharge Recommendations: Low Intensity Therapy  Discharge Equipment Recommendations:  walker, rolling  Barriers to discharge:  None    Assessment:     Wendy Matos is a 29 y.o. male with a medical diagnosis of Primary osteoarthritis of right hip. Performance deficits affecting function are weakness, impaired endurance, impaired self care skills, impaired functional mobility, decreased lower extremity function, pain, orthopedic precautions.     Rehab Prognosis:  Fair; patient would benefit from acute skilled OT services to address these deficits and reach maximum level of function.       Plan:     Patient to be seen  (BID) to address the above listed problems via self-care/home management, therapeutic activities, therapeutic exercises  Plan of Care Expires: 03/19/25  Plan of Care Reviewed with: patient, spouse    Subjective     Chief Complaint: no new complaints  Patient/Family Comments/goals: "Yeah I need a break."  Pain/Comfort:  Pain Rating 1:  (unrated)  Location - Side 1: Right  Location - Orientation 1: generalized  Location 1: hip  Pain Addressed 1: Distraction, Reposition    Objective:     Communicated with: NSG prior to session.  Patient found up in chair with peripheral IV upon OT entry to room.    General Precautions: Standard, fall    Orthopedic Precautions:RLE partial weight bearing, RLE posterior precautions (75%)  Braces: N/A  Respiratory Status: Room air     Occupational Performance:     Functional Mobility/Transfers:  Patient completed Sit <> Stand Transfer with contact guard assistance  with  rolling walker   Patient completed Tub Transfer Step Transfer technique with stand by assistance with rolling walker and bath bench    Treatment & Education:  Mod verbal cues about not exaggerating hip extension when sitting, reminded him of hip " precautions and the allowances of flexing up to 90 degrees. Verbalized understanding, improved technique.     Patient left up in chair with all lines intact, call button in reach, and NSG notified    GOALS:   Multidisciplinary Problems       Occupational Therapy Goals          Problem: Occupational Therapy    Goal Priority Disciplines Outcome Interventions   Occupational Therapy Goal     OT, PT/OT Progressing    Description: Pt will perform LB dressing standby with AE PRN by d/c.  Pt will perform toileting standby A by d/c.  Pt will perform toilet t/f standby A with LRAD by d/c.  Pt will perform tub t/f standby assist c TTB by d/c. MET  Pt will perform car t/f standby assist in adherence to pxns by d/c. MET                       DME Justifications:  No DME recommended requiring DME justifications    Time Tracking:     OT Date of Treatment: 03/14/25  OT Start Time: 1152  OT Stop Time: 1205  OT Total Time (min): 13 min    Billable Minutes:Self Care/Home Management 13    OT/FREDA: OT          3/14/2025

## 2025-03-14 NOTE — PROGRESS NOTES
"No acute events overnight.  Pain controlled.  Resting in bed.     Vital Signs  Temp: 98.5 °F (36.9 °C)  Temp Source: Oral  Pulse: 103  Heart Rate Source: Monitor  Resp: 18  SpO2: 98 %  Pulse Oximetry Type: Intermittent  Flow (L/min) (Oxygen Therapy): 10  Device (Oxygen Therapy): room air  BP: 129/83  BP Location: Right arm  BP Method: Automatic  Patient Position: Sitting  Height and Weight  Height: 5' 9" (175.3 cm)  Height Method: Stated  Weight: 90.7 kg (200 lb)  Weight Method: Standard Scale  BSA (Calculated - sq m): 2.1 sq meters  BMI (Calculated): 29.5  Weight in (lb) to have BMI = 25: 168.9]    +FHL/EHL  BCR distally  Dressing c/d/i  SILT distally    Recent Lab Results         03/14/25  0406        Anion Gap 10.0       BUN 9.3       BUN/CREAT RATIO 12       Calcium 8.6       Chloride 105       CO2 24       Creatinine 0.75       eGFR >60  Comment: Estimated GFR calculated using the CKD-EPI creatinine (2021) equation.       Glucose 118       Hematocrit 29.9       Hemoglobin 10.0       MCH 27.9       MCHC 33.4       MCV 83.3       MPV 12.1       nRBC 0.0       Platelet Count 159       Potassium 3.6       RBC 3.59       RDW 13.6       Sodium 139       WBC 8.69               A/P:  Status post AQUILES with periprosthetic femur fracture  Pain controlled  Overall patient doing well.  Therapy for mobility and ambulation.  DVT PPx  Will increase to 75% WB to improve mobility  Home later today when clears stairs  "

## 2025-03-14 NOTE — PLAN OF CARE
I spoke to Volodymyr with [facility]  and she said they have accepted pt, pending insurance auth. Pt states he has a friend that will drive him home around 530 this afternoon. Pt wife at bedside and states she will be home to help. [employer] delivered elevated toilet seat.   DC orders sent to [facility]  via [employer].

## 2025-03-14 NOTE — ANESTHESIA POSTPROCEDURE EVALUATION
Anesthesia Post Evaluation    Patient: Wendy Matos    Procedure(s) Performed: Procedure(s) (LRB):  ROBOTIC ARTHROPLASTY, HIP, TOTAL, POSTERIOR APPROACH (Right)    Final Anesthesia Type: general      Patient location during evaluation: PACU  Patient participation: Yes- Able to Participate  Level of consciousness: awake and alert  Post-procedure vital signs: reviewed and stable  Pain management: adequate  Airway patency: patent    PONV status at discharge: No PONV  Anesthetic complications: no      Respiratory status: unassisted  Hydration status: euvolemic  Follow-up not needed.              Vitals Value Taken Time   /83 03/14/25 07:53   Temp 36.9 °C (98.5 °F) 03/14/25 07:53   Pulse 103 03/14/25 07:53   Resp 18 03/14/25 07:53   SpO2 98 % 03/14/25 07:53         Event Time   Out of Recovery 12:20:00         Pain/Alex Score: Pain Rating Prior to Med Admin: 5 (3/14/2025  6:20 AM)  Pain Rating Post Med Admin: 2 (3/14/2025  7:20 AM)

## 2025-03-14 NOTE — PT/OT/SLP PROGRESS
Physical Therapy Treatment    Patient Name:  Wendy Matos   MRN:  [id number]    Recommendations:     Discharge therapy intensity: Low Intensity Therapy   Discharge Equipment Recommendations: walker, rolling  Barriers to discharge: None    Assessment:     Wendy Matos is a 29 y.o. male admitted with a medical diagnosis of Primary osteoarthritis of right hip.  He presents with the following impairments/functional limitations: weakness, impaired endurance, impaired functional mobility, decreased lower extremity function, pain, decreased ROM, edema, orthopedic precautions.    Rehab Prognosis: Good; patient would benefit from acute skilled PT services to address these deficits and reach maximum level of function.    Recent Surgery: Procedure(s) (LRB):  ROBOTIC ARTHROPLASTY, HIP, TOTAL, POSTERIOR APPROACH (Right) 2 Days Post-Op    Plan:     During this hospitalization, patient would benefit from acute PT services BID to address the identified rehab impairments via gait training, therapeutic activities, therapeutic exercises and progress toward the following goals:    Plan of Care Expires:  03/18/25    Subjective     Chief Complaint: dry mouth  Patient/Family Comments/goals: motivated to participate  Pain/Comfort:  Pain Rating 1: 5/10  Location - Side 1: Right  Location - Orientation 1: generalized  Location 1: hip  Pain Addressed 1: Pre-medicate for activity, Distraction      Objective:     Communicated with RN prior to session.  Patient found supine with peripheral IV upon PT entry to room.     General Precautions: Standard, fall  Orthopedic Precautions: RLE posterior precautions, RLE partial weight bearing (75%)  Braces: N/A  Respiratory Status: Room air      Functional Mobility:  Bed Mobility:     Supine to Sit: supervision  Transfers:     Sit to Stand:  contact guard assistance with rolling walker; cues for safety awareness  Gait: The pt ambulated 100 ft with RW and SBA. Step-through gait pattern, fair pace.   Stairs:  Pt  ascended/descended 28 + 12 stair(s) with BUE support on right handrail with Stand-by Assistance. Seated rest break between bouts. Patient educated on taking breaks and possibly setting up folding chair on the landing for seated rest if needed when navigating stairs at home. No LOB noted when navigating stairs      Education:  Patient provided with verbal education education regarding PT role/goals/POC, post-op precautions, fall prevention, safety awareness, and discharge/DME recommendations.  Understanding was verbalized.     Patient left up in chair with all lines intact and LISA Irizarry, present to initiate treatment session.     GOALS:   Multidisciplinary Problems       Physical Therapy Goals          Problem: Physical Therapy    Goal Priority Disciplines Outcome Interventions   Physical Therapy Goal     PT, PT/OT Progressing    Description: Pt will improve functional independence by performing:    Bed mobility: SBA - MET  Sit to stand: SBA with rolling walker  Bed to chair t/f: SBA with Stand Step  with rolling walker  Ambulation x 200'  with SBA with rolling walker  1 Step (Curb): Min A  with rolling walker MET  10x4 Steps: Min A  with B HR  Independent with total hip HEP                        Time Tracking:     PT Received On:    PT Start Time: 1110     PT Stop Time: 1155  PT Total Time (min): 45 min     Billable Minutes: Gait Training 10 and Therapeutic Activity 35    Treatment Type: Treatment  PT/PTA: PT     Number of PTA visits since last PT visit: 0     03/14/2025

## 2025-03-14 NOTE — PLAN OF CARE
Problem: Adult Inpatient Plan of Care  Goal: Plan of Care Review  3/14/2025 1804 by Bouchra Robertson RN  Outcome: Met  3/14/2025 0741 by Bouchra Robertson RN  Outcome: Progressing  Goal: Patient-Specific Goal (Individualized)  3/14/2025 1804 by Bouchra Robertson RN  Outcome: Met  3/14/2025 0741 by Bouchra Robertson RN  Outcome: Progressing  Goal: Absence of Hospital-Acquired Illness or Injury  3/14/2025 1804 by Bouchra Robertson RN  Outcome: Met  3/14/2025 0741 by Bouchra Robertson RN  Outcome: Progressing  Goal: Optimal Comfort and Wellbeing  3/14/2025 1804 by Bouchra Robertson RN  Outcome: Met  3/14/2025 0741 by Bouchra Robertson RN  Outcome: Progressing  Goal: Readiness for Transition of Care  3/14/2025 1804 by Bouchra Robertson RN  Outcome: Met  3/14/2025 0741 by Bouchra Robertson RN  Outcome: Progressing     Problem: Wound  Goal: Optimal Coping  3/14/2025 1804 by Bouchra Robertson, JOSI  Outcome: Met  3/14/2025 0741 by Bouchra Robertson RN  Outcome: Progressing  Goal: Optimal Functional Ability  3/14/2025 1804 by Bouchra Robertson RN  Outcome: Met  3/14/2025 0741 by Bouchra Robertson RN  Outcome: Progressing  Goal: Absence of Infection Signs and Symptoms  3/14/2025 1804 by Bouchra Robertson, JOSI  Outcome: Met  3/14/2025 0741 by Bouchra Robertson, RN  Outcome: Progressing  Goal: Improved Oral Intake  3/14/2025 1804 by Bouchra Robertson, JOSI  Outcome: Met  3/14/2025 0741 by Bouchra Robertson RN  Outcome: Progressing  Goal: Optimal Pain Control and Function  3/14/2025 1804 by Bouchra Robertson, JOSI  Outcome: Met  3/14/2025 0741 by Bouchra Robertson RN  Outcome: Progressing  Goal: Skin Health and Integrity  3/14/2025 1804 by Bouchra Robertson, JOSI  Outcome: Met  3/14/2025 0741 by Bouchra Robertson, RN  Outcome: Progressing  Goal: Optimal Wound Healing  3/14/2025 1804 by Bouchra Robertson, JOSI  Outcome: Met  3/14/2025 0741 by Bouchra Robertson, RN  Outcome: Progressing     Problem: Infection  Goal: Absence of Infection Signs and Symptoms  3/14/2025 1804 by Bouchra Robertson, RN  Outcome: Met  3/14/2025 0741 by Bouchra Robertson,  RN  Outcome: Progressing     Problem: Hip Arthroplasty  Goal: Optimal Coping  3/14/2025 1804 by Bouchra Robertson, JOSI  Outcome: Met  3/14/2025 0741 by Bouchra Robertson RN  Outcome: Progressing  Goal: Absence of Bleeding  3/14/2025 1804 by Bouchra Robertson, JOSI  Outcome: Met  3/14/2025 0741 by Bouchra Robertson, RN  Outcome: Progressing  Goal: Effective Bowel Elimination  3/14/2025 1804 by Bouchra Robertson, JOSI  Outcome: Met  3/14/2025 0741 by Bouchra Robertson RN  Outcome: Progressing  Goal: Fluid and Electrolyte Balance  3/14/2025 1804 by Bouchra Robertson, JOSI  Outcome: Met  3/14/2025 0741 by Bouchra Robertson RN  Outcome: Progressing  Goal: Optimal Functional Ability  3/14/2025 1804 by Bouchra Robertson, JOSI  Outcome: Met  3/14/2025 0741 by Bouchra Robertson, RN  Outcome: Progressing  Goal: Absence of Infection Signs and Symptoms  3/14/2025 1804 by Bouchra Robertson, JOSI  Outcome: Met  3/14/2025 0741 by Bouchra Robertson RN  Outcome: Progressing  Goal: Intact Neurovascular Status  3/14/2025 1804 by Bouchra Robertson, JOSI  Outcome: Met  3/14/2025 0741 by Bouchra Robertson RN  Outcome: Progressing  Goal: Anesthesia/Sedation Recovery  3/14/2025 1804 by Bouchra Robertson, JOSI  Outcome: Met  3/14/2025 0741 by Bouchra Robertson, RN  Outcome: Progressing  Goal: Acceptable Pain Control  3/14/2025 1804 by Bouchra Robertson, JOSI  Outcome: Met  3/14/2025 0741 by Bouchra Robertson, RN  Outcome: Progressing  Goal: Nausea and Vomiting Relief  3/14/2025 1804 by Bouchra Robertson, JOSI  Outcome: Met  3/14/2025 0741 by Bouchra Robertson, RN  Outcome: Progressing  Goal: Effective Urinary Elimination  3/14/2025 1804 by Bouchra Robertson, JOSI  Outcome: Met  3/14/2025 0741 by Bouchra Robertson, RN  Outcome: Progressing  Goal: Effective Oxygenation and Ventilation  3/14/2025 1804 by Bouchra Robertson, JOSI  Outcome: Met  3/14/2025 0741 by Bouchra Robertson, RN  Outcome: Progressing     Problem: Fall Injury Risk  Goal: Absence of Fall and Fall-Related Injury  3/14/2025 1804 by Bouchra Robertson RN  Outcome: Met  3/14/2025 0741 by Bouchra Robertson, RN  Outcome:  Progressing

## 2025-03-14 NOTE — PLAN OF CARE
Problem: Adult Inpatient Plan of Care  Goal: Plan of Care Review  Outcome: Progressing  Goal: Patient-Specific Goal (Individualized)  Outcome: Progressing  Goal: Absence of Hospital-Acquired Illness or Injury  Outcome: Progressing  Goal: Optimal Comfort and Wellbeing  Outcome: Progressing  Goal: Readiness for Transition of Care  Outcome: Progressing     Problem: Wound  Goal: Optimal Coping  Outcome: Progressing  Goal: Optimal Functional Ability  Outcome: Progressing  Goal: Absence of Infection Signs and Symptoms  Outcome: Progressing  Goal: Improved Oral Intake  Outcome: Progressing  Goal: Optimal Pain Control and Function  Outcome: Progressing  Goal: Skin Health and Integrity  Outcome: Progressing  Goal: Optimal Wound Healing  Outcome: Progressing     Problem: Hip Arthroplasty  Goal: Optimal Coping  Outcome: Progressing  Goal: Absence of Bleeding  Outcome: Progressing  Goal: Effective Bowel Elimination  Outcome: Progressing  Goal: Fluid and Electrolyte Balance  Outcome: Progressing  Goal: Optimal Functional Ability  Outcome: Progressing  Goal: Absence of Infection Signs and Symptoms  Outcome: Progressing  Goal: Intact Neurovascular Status  Outcome: Progressing  Goal: Anesthesia/Sedation Recovery  Outcome: Progressing  Goal: Acceptable Pain Control  Outcome: Progressing  Goal: Nausea and Vomiting Relief  Outcome: Progressing  Goal: Effective Urinary Elimination  Outcome: Progressing  Goal: Effective Oxygenation and Ventilation  Outcome: Progressing

## 2025-03-17 ENCOUNTER — TELEPHONE (OUTPATIENT)
Dept: ORTHOPEDICS | Facility: CLINIC | Age: 30
End: 2025-03-17
Payer: COMMERCIAL

## 2025-03-17 NOTE — TELEPHONE ENCOUNTER
Ortho post op follow up call completed (R AQUILES) Patient states he lives on 3rd floor of apartment complex and had to walk up 40 steps and had some dizziness initially, but it is better now. [facility] home health nurse admitted on Saturday and nursing and PT will go today. Patient will transition to [facility] on [address] after 3 weeks. Reminded no twisting, leaning over or crossing legs at ankles or knees and post op follow up appointment is 3-27 at 2:15 with Alice. Encouraged to call with any questions or concerns.

## 2025-03-20 ENCOUNTER — TELEPHONE (OUTPATIENT)
Dept: ORTHOPEDICS | Facility: CLINIC | Age: 30
End: 2025-03-20
Payer: COMMERCIAL

## 2025-03-20 NOTE — TELEPHONE ENCOUNTER
Patient called and lvm requesting a call back with no further information.     Attempted to contact patient. No answer, lvm.

## 2025-03-27 ENCOUNTER — OFFICE VISIT (OUTPATIENT)
Dept: ORTHOPEDICS | Facility: CLINIC | Age: 30
End: 2025-03-27
Payer: COMMERCIAL

## 2025-03-27 ENCOUNTER — HOSPITAL ENCOUNTER (OUTPATIENT)
Dept: RADIOLOGY | Facility: CLINIC | Age: 30
Discharge: HOME | End: 2025-03-27
Attending: PHYSICIAN ASSISTANT
Payer: COMMERCIAL

## 2025-03-27 DIAGNOSIS — Z96.641 AFTERCARE FOLLOWING RIGHT HIP JOINT REPLACEMENT SURGERY: Primary | ICD-10-CM

## 2025-03-27 DIAGNOSIS — Z47.1 AFTERCARE FOLLOWING RIGHT HIP JOINT REPLACEMENT SURGERY: ICD-10-CM

## 2025-03-27 DIAGNOSIS — Z96.641 AFTERCARE FOLLOWING RIGHT HIP JOINT REPLACEMENT SURGERY: ICD-10-CM

## 2025-03-27 DIAGNOSIS — Z47.1 AFTERCARE FOLLOWING RIGHT HIP JOINT REPLACEMENT SURGERY: Primary | ICD-10-CM

## 2025-03-27 PROCEDURE — 1159F MED LIST DOCD IN RCRD: CPT | Mod: CPTII,,, | Performed by: PHYSICIAN ASSISTANT

## 2025-03-27 PROCEDURE — 3044F HG A1C LEVEL LT 7.0%: CPT | Mod: CPTII,,, | Performed by: PHYSICIAN ASSISTANT

## 2025-03-27 PROCEDURE — 1160F RVW MEDS BY RX/DR IN RCRD: CPT | Mod: CPTII,,, | Performed by: PHYSICIAN ASSISTANT

## 2025-03-27 PROCEDURE — 99024 POSTOP FOLLOW-UP VISIT: CPT | Mod: ,,, | Performed by: PHYSICIAN ASSISTANT

## 2025-03-27 PROCEDURE — 73502 X-RAY EXAM HIP UNI 2-3 VIEWS: CPT | Mod: RT,,, | Performed by: PHYSICIAN ASSISTANT

## 2025-03-27 RX ORDER — HYDROCODONE BITARTRATE AND ACETAMINOPHEN 5; 325 MG/1; MG/1
1 TABLET ORAL EVERY 6 HOURS PRN
COMMUNITY

## 2025-03-28 NOTE — PROGRESS NOTES
Chief Complaint:   Chief Complaint   Patient presents with    Post-op Evaluation     R AQUILES sx 03/12/25 - 4/26/25 - states he has been doing better. Presents with a walker. States he has been doing home health physical therapy        History of present illness:    This is a 29 y.o. year old male   History of Present Illness    CHIEF COMPLAINT:  - Follow-up s/p hip replacement    HPI:  Wendy presents for follow-up approximately two weeks after hip replacement. He reports significant improvement in his hip condition compared to his pre-operative state. He is currently using a walker and inquires about discontinuing its use. He confirms he is bearing 75% of his weight on the operated leg, as instructed at hospital discharge.    IMAGING:  - XR Hip: The images show the new hip implant and a cable placed around a small crack that had developed in the bone. Everything appears to be in good position, with no movement or displacement noted.    SURGICAL HISTORY:  - Hip replacement: 2 weeks ago. A cable was placed around a small crack in the bone during the procedure due to bone brittleness.         Current Outpatient Medications   Medication Sig    celecoxib (CELEBREX) 200 MG capsule Take by mouth 2 (two) times daily.    HYDROcodone-acetaminophen (NORCO) 5-325 mg per tablet Take 1 tablet by mouth every 6 (six) hours as needed for Pain.    aspirin 81 MG Chew Take 1 tablet (81 mg total) by mouth 2 (two) times a day. Blood clot prevention (Patient not taking: Reported on 3/27/2025)     No current facility-administered medications for this visit.       Review of Systems:    Constitution:   Denies chills, fever, and sweats.  HENT:   Denies headaches or blurry vision.  Cardiovascular:  Denies chest pain or irregular heart beat.  Respiratory:   Denies cough or shortness of breath.  Gastrointestinal:  Denies abdominal pain, nausea, or vomiting.  Musculoskeletal:   Denies muscle cramps.  Neurological:   Denies dizziness or focal  weakness.  Psychiatric/Behavior: Normal mental status.  Hematology/Lymph:  Denies bleeding problem or easy bruising/bleeding.  Skin:    Denies rash or suspicious lesions.    Examination:    Vital Signs:    Vitals:    03/27/25 1404   BP: (P) 125/79   Pulse: (!) (P) 111       There is no height or weight on file to calculate BMI.    Constitution:   Well-developed, well nourished patient in no acute distress.  Neurological:   Alert and oriented x 3 and cooperative to examination.     Psychiatric/Behavior: Normal mental status.  Respiratory:   No shortness of breath.  Eyes:    Extraoccular muscles intact  Skin:    No scars, rash or suspicious lesions.    Physical Exam:   Right Hip    No swelling, redness or increased heat.    Wound healing normal. Surgical incision C/D/I     Motion was normal.     Weakness of the  hip was observed.    Sensation intact distally    Intact pedal pulses    X-Ray Hip:   Complete right hip x-ray with two or more views of the AP and lateral aspects were performed.   Impressions Radiology Test   X-ray of hip was performed showed intact  hip prosthesis           Assessment: Aftercare following right hip joint replacement surgery  -     X-Ray Hip 2 or 3 views Right with Pelvis when performed; Future; Expected date: 03/27/2025         Plan:  Assessment & Plan    RIGHT ARTIFICIAL HIP JOINT:  - Removed surgical staples from the patient's right hip incision.  - Applied butterfly strips to the incision site.  - Remove in 3-5 days when edges start to curl up.  - Increase weight-bearing gradually over the next month, progressing to full weight-bearing.  - Back off if increased pain occurs when putting more weight on the lower extremity.  - Shower permitted immediately.  - Avoid soaking in a tub for at least 1 week.  - Use regular soap and water to clean incision site.    FOLLOW-UP:  - Follow up in 1 month.               DISCLAIMER: This note may have been dictated using voice recognition software and  may contain grammatical errors.     NOTE: Consult report sent to referring provider via [employer] EMR.

## 2025-04-04 NOTE — PROGRESS NOTES
Patient ID: [id number]     Chief Complaint: Ankylosing Spondylitis (Pt states that he had hip replacement surgery 03/12/25. He states that everything is going okay since, and is able to walk a little bit without the walker. )      Referred By: Peter Osei     HPI:     Wendy Matos is a 29 y.o. male here today for a new patient visit for +HLA B27.      Presents today for evaluation of +HLA B27. At the time of dx he was in [facility], his home country. He was having issues with walking, pain in back or hips, was not sure where it was coming from. He recalls not being able to fully walk without a limp for 1 year. He was prescribed Ibuprofen but still not fully resolved of pain- at this time he joined the gym and managed to recover himself- states he was getting stability and mobility back. He reports from [id number] much improvement, pain would come and go- last roughly 2-3 days then resolve, until May of 2024- he reports pain returned and never left as previously and eventually lead to right hip replacement in March of 2025- pain extending into his back, upper thigh and groin- he does recall having pain in his ankles and knees in the past as well but nothing like the hip. He also reports occ neck pain but resolves quickly when does occur. He states he did not remember what other medication he took- he recalls taking it only once a month- states he took 2 times and felt worse on medication than off- was having hair loss and GI issues but cannot recall the name of medication so d/c and started at the gym. He states majority of pain has been in his right hip, occ knee and felt very tight in his hamstrings, left is also bothersome but right worse. Denies red/warm/swollen joints that he can recall. Morning stiffness <5 minutes, not bad. Pain improves with movement- zach with walking until May 2024, hip pain was worse and very painful to ambulate which led to surgery. Denies history rashes. Denies dactylitis/enthesitis.      He did have a previous hip injection with Ortho prior to surgery which provided temporary relief for roughly 2 days.  Three view right hip x-ray showed end-stage osteoarthritis loss of joint space and bone-on-bone articulation of the right hip.  03/12/2025 right AQUILES with Dr. Mathias. A cable was placed around a small crack in the bone during the procedure due to bone brittleness    Dr. Mathias    Denies history of fevers, rashes, photosensitivity, oral or nasal ulcers, h/o MI, stroke, seizures, h/o PE or DVT, Raynaud's phenomenon, uveitis, malignancies.   Family history of autoimmune disease: None that he is aware of  Smoking: Start age of 17 years old, 1 pack per week at this time- is working on cessation    -------------------------------------    Ankylosing spondylitis    Arthritis    Hypertension        Past Surgical History:   Procedure Laterality Date    INJECTION OF JOINT Right 01/08/2025    Álvaro Mathias MD; Injection, Joint, Rt Hip;  no anesthesia    ROBOTIC ARTHROPLASTY,HIP,TOTAL,POSTERIOR APPROACH Right 3/12/2025    Procedure: ROBOTIC ARTHROPLASTY, HIP, TOTAL, POSTERIOR APPROACH;  Surgeon: Álvaro Mathias MD;  Location: [facility];  Service: Orthopedics;  Laterality: Right;       Review of patient's allergies indicates:   Allergen Reactions    Beef containing products      Not a true allergy - does not eat beef       Current Outpatient Medications   Medication Instructions    aspirin 81 mg, Oral, 2 times daily, Blood clot prevention    celecoxib (CELEBREX) 200 MG capsule 2 times daily    HYDROcodone-acetaminophen (NORCO) 5-325 mg per tablet 1 tablet, Every 6 hours PRN       Social History[1]     Family History   Problem Relation Name Age of Onset    Diabetes Mother      Hypertension Mother      Hypertension Father      Diabetes Father            There is no immunization history on file for this patient.    Patient Care Team:  Beny Benson MD as PCP - General (Internal Medicine)  "    Subjective:     ROS    Constitutional:  Denies chills. Denies fever. Denies night sweats. Denies weight loss. Denies sleep disturbance.   Ophthalmology: Denies blurred vision. Denies diminished visual acuity. Denies dry eyes. Denies eye pain. Denies Itching and redness.   ENT: Denies decreased hearing. Denies oral ulcers. Denies epistaxis. Denies swelling of ears or throat. Denies dry mouth. Denies swollen glands. Denies hair loss.   Endocrine: Denies diabetes. Denies thyroid Problems.   Respiratory: Denies cough. Denies shortness of breath. Denies shortness of breath with exertion. Denies hemoptysis.   Cardiovascular: Denies chest pain at rest. Denies chest pain with exertion. Denies Irregular heartbeat. Denies palpitations. Denies edema. Denies orthopnea.   Gastrointestinal: Denies abdominal pain. Admits diarrhea- occ. Denies nausea. Denies vomiting. Denies hematemesis or hematochezia. Denies change in appetite. Denies heartburn.  Genitourinary: Denies dysuria. Denies urinary frequency. Denies urinary urgency. Denies blood in urine.  Musculoskeletal: See HPI for details  Integumentary: Denies rash. Denies Itching. Denies dry skin. Denies photosensitivity.   Peripheral Vascular: Denies Ulcers of hands and/or feet. Denies Cold extremities.   Neurologic: Denies dizziness. Denies headache. Denies difficulty speaking. Denies loss of strength. Denies numbness or tingling.   Psychiatric: Admits depression and anxiety- situational, was laid off after his recent surgery- so has had increase in stress. Denies suicidal/homicidal ideations.      Objective:     Visit Vitals  /86 (BP Location: Left arm, Patient Position: Sitting)   Pulse 83   Temp 97.8 °F (36.6 °C) (Oral)   Resp 16   Ht 5' 9" (1.753 m)   Wt 88.4 kg (194 lb 12.8 oz)   SpO2 100%   BMI 28.77 kg/m²       Physical Exam    General Appearance: alert, pleasant, in no acute distress.  Skin: Skin color, texture, turgor normal. No rashes or lesions.  Eyes:  " extraocular movement intact (EOMI), pupils equal, round, reactive to light and accommodation, conjunctiva clear.  ENT: No oral or nasal ulcers.  Neck:  Neck supple. No adenopathy.   Lungs: CTA bilaterally without crackles, rhonchi, or wheezes.   Heart: RRR w/o murmurs.  No edema. 2+ DP pulse.  Abdomen: Soft, non-tender, no masses, rebound or guarding.  Neuro: Alert, oriented, CN II-XII GI, sensory and motor innervation intact.  Musculoskeletal: No active synovitis noted to bilateral MCPs/PIPs/DIPs, FROM noted to bilateral wrists, elbows and shoulders with no pain, FROM noted to bilateral knees, no pain, no effusions noted, no pain to bilateral ankles or MTPs, pes planus noted bilaterally with bilateral great toe nail discoloration, good ROM to left hip, right hip with limited ROM due to recent surgery- incision site healing well, no s/s of infection, swelling or discharge noted, steri strips remain intact.  No soft tissue tender points noted on exam. Unable to assess ROM of spine due to right hip surgery at this time.  Psych: Alert, oriented, normal eye contact.       Labs Reviewed:   12/19/2024 HLA B27 positive, AAKASH negative, dsDNA, CCP negative, RF negative    Rheumatology:  Lab Results   Component Value Date    CCPANTIBODIE 2.1 12/19/2024    RSYI56EX Positive 12/19/2024    INTER SEE COMMENTS 12/19/2024    SEDRATE 7 04/08/2025        Chemistry:  Lab Results   Component Value Date     04/08/2025    K 4.2 04/08/2025    BUN 7.9 (L) 04/08/2025    CREATININE 0.89 04/08/2025    EGFRNORACEVR >60 04/08/2025    GLUCOSE 115 (H) 04/08/2025    CALCIUM 9.8 04/08/2025    ALKPHOS 112 04/08/2025    LABPROT 8.4 (H) 04/08/2025    ALBUMIN 4.3 04/08/2025    AST 35 04/08/2025    ALT 64 (H) 04/08/2025        Hematology:  Lab Results   Component Value Date    WBC 6.39 04/08/2025    HGB 14.1 04/08/2025    HCT 44.2 04/08/2025     04/08/2025        Urine:  Lab Results   Component Value Date    APPEARANCEUA Slightly Cloudy (A)  "2025    SGUA >=1.030 2025    PROTEINUA Negative 2025    KETONESUA Negative 2025    LEUKOCYTESUR Negative 2025        No results found for: "PROTEINURINE", "CREATRANDUR", "UPROTCREA"     Imagin2024 MRI Lspine W/O: Impression: Mild lower lumbar degenerative disc disease and degenerative facet arthrosis with mild left foraminal narrowing at L3-L4.  No spinal canal or lateral recess stenosis. Bony ankylosis of the bilateral SI joints, most likely due to seronegative spondyloarthropathy.    2024 Right Knee Xray: Impression: No acute osseous right knee pathology identified     2024 Right Hip Xray: There is narrowing of the inferior medial and superolateral aspect of both hip joints with degenerative changes of the sacroiliac joints and lumbosacral spine articular spaces are otherwise preserved with smooth articular surfaces  No blastic or lytic lesions. Soft tissues within normal limits. Impression: Degenerative changes.     2025 chest x-ray:  Impression: No acute cardiopulmonary abnormality.    2025 right hip x-ray: Impression: Hip prosthesis    Assessment:       ICD-10-CM ICD-9-CM   1. Ankylosing spondylitis of lumbosacral region  M45.7 720.0   2. HLA B27 positive  Z15.89 V84.89   3. Status post right hip replacement  Z96.641 V43.64   4. Right hip pain  M25.551 719.45   5. High risk medication use  Z79.899 V58.69   6. Drug-induced immunodeficiency  D84.821 279.3    Z79.899 E947.9   7. Routine eye exam  Z01.00 V72.0   8. Cigarette nicotine dependence without complication  F17.210 305.1        Plan:     1. Ankylosing spondylitis of lumbosacral region (Primary)/+HLA B27  Very pleasant 29-year-old male who presents today for evaluation of + HLA B27/ankylosing spondylitis.  He reports being diagnosed around  in his home country of [facility]. He reports initially being rx Ibuprofen and another medication that he took once a month- recalls having s/e of GI " issues/hair loss- only took 2 doses and d/c- reports turned to the gym for exercise and really resolved overall pain- would come and go but last a few days then resolve. Until last May 2024- pain to right hip presented and did not resolve- was having difficulty with ambulation- was following with Ortho, and ultimately had right hip replacement last month and is doing well. He recalls having pain to bilateral hips with right always being worse, occ pain in knees and neck at times but does not last long. Denies rashes, red/itchy/painful eyes, he denies any abd pain but does have occ diarrhea, denies dactylitis or enthesitis that he can recall. Today on exam, limited ROM to right hip- post op, unable to assess ROM to spine due to recent hip surgery, no active synovitis noted.   - Will obtain Xrays of left hip today  - Infection w/u along with labs  - Pending labs, will start Humira 40 mg subq q14 days as directed- demo and med administration and s/e reviewed by [employer]  - Once starts meds, will need repeat lab in 4 weeks as well.     Discussed the use of anti-TNF agents in inflammatory autoimmune disease.  TNF-alpha promotes the inflammatory response, which in turn causes many of the clinical problems associated with autoimmune disorders such as rheumatoid arthritis, ankylosing spondylitis, Crohn's disease, psoriasis, hidradenitis suppurativa and refractory asthma.  Inhibition of TNF-alpha can decrease inflammation and in turn stop or decrease joint damage.    This inhibition of TNF-alpha can be achieved with a monoclonal antibody such as infliximab (Remicade), adalimumab (Humira), certolizumab pegol (Cimzia), and golimumab (Simponi), or with a circulating receptor fusion protein such as etanercept (Enbrel).  These agents can be used as mono-therapy or in combination with other immunosuppressants.  Side effects include infection, especially opportunistic infections such as tuberculosis, fungal infections and  certain types of bacterial infections.    2.  Status post right hip replacement  - March 2025 with Dr. Mathias- still in PT 3 times a week and tolerating well, continues use with walker at this time as he is not full weight bearing at this time, incision healing well, no s/s of infection  - Continue to f/u with Ortho as scheduled along with PT    3. High risk medication use/ Drug-induced immunodeficiency  - Persons with rheumatoid arthritis, lupus, psoriatic arthritis and other autoimmune diseases are at increased risk of cardiovascular disease including heart attack and stroke. We recommend that all patients with these conditions have annual health maintenance exams including lipid measurements, blood pressure measurements, and smoking cessation counseling when applicable at their primary care provider's office.  - Advised to stay up-to-date on age appropriate vaccinations and malignancy screening with PCP.   - Continued lab monitoring.     4. Routine eye exam  - Ambulatory referral/consult to Ophthalmology to [facility] care, denies issues with red/itchy/painful eyes    5. Nicotine Dependence  - At length discussion in regards to cessation, he is currently working on quitting since his surgery, enc to reach out in the future if he desires assistance.      Follow up in about 3 months (around 7/8/2025) for NP Follow Up. In addition to their scheduled follow up, the patient has also been instructed to follow up on as needed basis.       Total time spent with patient and documentation is 70 minutes- additional 10 minutes as Tram Do, PharmD also provided injection demonstration and education with new medication start including review of s/e. She will also touch base with the patient in 1-2 weeks once medication is started to ensure no further questions.  All questions were answered to patient's satisfaction and patient verbalized understanding. This includes face to face time and non-face to face time preparing to see the  patient (eg, review of tests), obtaining and/or reviewing separately obtained history, documenting clinical information in the electronic or other health record, independently interpreting results and communicating results to the patient/family/caregiver, or care coordinator.  Total time also spent discussing importance of tobacco cessation 3 minutes.            Orders Placed This Encounter   Procedures    X-Ray Cervical Spine 5 View W Flex Extxt     Standing Status:   Future     Number of Occurrences:   1     Expected Date:   4/8/2025     Expiration Date:   4/8/2026     May the Radiologist modify the order per protocol to meet the clinical needs of the patient?:   Yes     Release to patient:   Immediate    X-Ray Hip 2 or 3 views Left with Pelvis when performed     Standing Status:   Future     Number of Occurrences:   1     Expected Date:   4/8/2025     Expiration Date:   4/8/2026     May the Radiologist modify the order per protocol to meet the clinical needs of the patient?:   Yes     Release to patient:   Immediate    HIV 1/2 Ag/Ab (4th Gen)     Standing Status:   Future     Number of Occurrences:   1     Expected Date:   4/8/2025     Expiration Date:   6/7/2026     Release to patient:   Immediate    Quantiferon Gold TB     Standing Status:   Future     Number of Occurrences:   1     Expected Date:   4/8/2025     Expiration Date:   6/7/2026    Hepatitis C Antibody     Standing Status:   Future     Number of Occurrences:   1     Expected Date:   4/8/2025     Expiration Date:   6/7/2026     Release to patient:   Immediate    Hepatitis B Surface Antigen     Standing Status:   Future     Number of Occurrences:   1     Expected Date:   4/8/2025     Expiration Date:   6/7/2026    Hepatitis B Core Antibody, Total     Standing Status:   Future     Number of Occurrences:   1     Expected Date:   4/8/2025     Expiration Date:   6/7/2026    Hepatitis B Surface Ab, Qualitative     Standing Status:   Future     Number of  Occurrences:   1     Expected Date:   4/8/2025     Expiration Date:   6/7/2026    CBC Auto Differential     Standing Status:   Future     Number of Occurrences:   1     Expected Date:   4/8/2025     Expiration Date:   6/7/2026    Comprehensive Metabolic Panel     Standing Status:   Future     Number of Occurrences:   1     Expected Date:   4/8/2025     Expiration Date:   6/7/2026    C-Reactive Protein     Standing Status:   Future     Number of Occurrences:   1     Expected Date:   4/8/2025     Expiration Date:   6/7/2026    Sedimentation rate     Standing Status:   Future     Number of Occurrences:   1     Expected Date:   4/8/2025     Expiration Date:   6/7/2026    Ambulatory referral/consult to Ophthalmology     Standing Status:   Future     Expected Date:   4/15/2025     Expiration Date:   5/8/2026     Referral Priority:   Routine     Referral Type:   Consultation     Referral Reason:   Specialty Services Required     Requested Specialty:   Ophthalmology     Number of Visits Requested:   1        BURT Viveros                 [1]   Social History  Socioeconomic History    Marital status: Unknown   Tobacco Use    Smoking status: Every Day     Current packs/day: 0.25     Average packs/day: 0.3 packs/day for 12.1 years (3.0 ttl pk-yrs)     Types: Vaping with nicotine, Cigarettes     Start date: 2/25/2013    Smokeless tobacco: Never   Substance and Sexual Activity    Alcohol use: Yes     Comment: ocas    Drug use: Not Currently    Sexual activity: Yes     Social Drivers of Health     Financial Resource Strain: Low Risk  (3/13/2025)    Overall Financial Resource Strain (CARDIA)     Difficulty of Paying Living Expenses: Not very hard   Food Insecurity: No Food Insecurity (3/13/2025)    Hunger Vital Sign     Worried About Running Out of Food in the Last Year: Never true     Ran Out of Food in the Last Year: Never true   Physical Activity: Insufficiently Active (3/13/2025)    Exercise Vital Sign      Days of Exercise per Week: 2 days     Minutes of Exercise per Session: 30 min   Stress: No Stress Concern Present (3/13/2025)    [employer] [place] - Occupational Stress Questionnaire     Feeling of Stress : Not at all   Housing Stability: Low Risk  (3/13/2025)    Housing Stability Vital Sign     Unable to Pay for Housing in the Last Year: No     Homeless in the Last Year: No

## 2025-04-08 ENCOUNTER — HOSPITAL ENCOUNTER (OUTPATIENT)
Dept: RADIOLOGY | Facility: HOSPITAL | Age: 30
Discharge: HOME | End: 2025-04-08
Attending: NURSE PRACTITIONER
Payer: COMMERCIAL

## 2025-04-08 ENCOUNTER — OFFICE VISIT (OUTPATIENT)
Dept: RHEUMATOLOGY | Facility: CLINIC | Age: 30
End: 2025-04-08
Payer: COMMERCIAL

## 2025-04-08 VITALS
TEMPERATURE: 98 F | HEART RATE: 83 BPM | OXYGEN SATURATION: 100 % | BODY MASS INDEX: 28.85 KG/M2 | WEIGHT: 194.81 LBS | DIASTOLIC BLOOD PRESSURE: 86 MMHG | HEIGHT: 69 IN | RESPIRATION RATE: 16 BRPM | SYSTOLIC BLOOD PRESSURE: 129 MMHG

## 2025-04-08 DIAGNOSIS — Z15.89 HLA B27 POSITIVE: ICD-10-CM

## 2025-04-08 DIAGNOSIS — F17.210 CIGARETTE NICOTINE DEPENDENCE WITHOUT COMPLICATION: ICD-10-CM

## 2025-04-08 DIAGNOSIS — M45.7 ANKYLOSING SPONDYLITIS OF LUMBOSACRAL REGION: ICD-10-CM

## 2025-04-08 DIAGNOSIS — Z96.641 STATUS POST RIGHT HIP REPLACEMENT: ICD-10-CM

## 2025-04-08 DIAGNOSIS — M45.7 ANKYLOSING SPONDYLITIS OF LUMBOSACRAL REGION: Primary | ICD-10-CM

## 2025-04-08 DIAGNOSIS — Z79.899 HIGH RISK MEDICATION USE: ICD-10-CM

## 2025-04-08 DIAGNOSIS — M25.551 RIGHT HIP PAIN: ICD-10-CM

## 2025-04-08 DIAGNOSIS — Z01.00 ROUTINE EYE EXAM: ICD-10-CM

## 2025-04-08 DIAGNOSIS — D84.821 DRUG-INDUCED IMMUNODEFICIENCY: ICD-10-CM

## 2025-04-08 DIAGNOSIS — Z79.899 DRUG-INDUCED IMMUNODEFICIENCY: ICD-10-CM

## 2025-04-08 PROCEDURE — 72052 X-RAY EXAM NECK SPINE 6/>VWS: CPT | Mod: TC

## 2025-04-08 PROCEDURE — 73502 X-RAY EXAM HIP UNI 2-3 VIEWS: CPT | Mod: TC,LT

## 2025-04-08 PROCEDURE — 99215 OFFICE O/P EST HI 40 MIN: CPT | Mod: PBBFAC,25 | Performed by: NURSE PRACTITIONER

## 2025-04-08 NOTE — PROGRESS NOTES
Patient Education on Humira    What is Humira? Humira (adalimumab) is a biologic medication that helps reduce inflammation by targeting and blocking a protein called tumor necrosis factor (TNF). It is used to treat various autoimmune conditions. By lowering inflammation, Humira can reduce symptoms like pain, swelling, and damage to the affected areas.     How Does Humira Work?  Humira is an immunosuppressant. In autoimmune diseases, the bodys immune system attacks its own tissues, causing inflammation and damage. By blocking TNF, Humira helps control the immune response, reducing inflammation and preventing further tissue damage.     How to Use Humira? Humira is administered as an injection under the skin. After your doctor provides training, you can give yourself injections at home.    Possible Side Effects     Common Side Effects:   Injection site reactions (redness, swelling, or pain)   Headaches   Upper respiratory infections (sinusitis or colds)   Nausea     Serious Side Effects:  Humira can increase your risk of infections because it suppresses your immune system. Contact your healthcare provider if you experience:   Fever, chills, cough, or other signs of infection   Shortness of breath   Unexplained bruising or bleeding   Numbness or tingling   Chest pain     Note: Its essential to report any serious side effects to your doctor right away, especially if you notice signs of infection, as infections can become severe.     Precautions   Infections: Avoid contact with people who have infections or contagious illnesses. Inform your doctor if you have a history of tuberculosis or hepatitis B.   Vaccines: Do not receive live vaccines while taking Humira. Discuss any vaccinations with your doctor before starting the medication.    Vaccine counseling:   Avoid live vaccines which include:  Intranasal Influenza LAIV4 (FluMist Quadrivalent)  Measles, Mumps, Rubella (MMR)  Rotavirus (RotaTeq, Rotarix)  Typhoid oral  capsule (Vivotif)  Varicella (Varivax)  Smallpox (Vaccinia)  Yellow Fever (YF-Vax)  Adenovirus  Cholera (Vaxchora)     Surgery: Tell your doctor you are on Humira if you are planning to have surgery, as it may affect your healing process.     When to Contact Your Doctor   If you miss a dose of Humira, take it as soon as you remember, unless it is almost time for your next dose. Do not double up on doses.   Call your doctor if you experience symptoms of infection, vision changes, numbness, or unusual weakness.     Remember: Humira is a long-term treatment that can help manage your condition, but regular monitoring and communication with your healthcare provider are essential for safety and effectiveness. If you have any questions or concerns, always reach out to your doctor.    Avoid consumption of raw seafood such as oysters/sushi.     How to administer Humira?  Before the Injection:   Wash your hands thoroughly with soap and water.   Remove the Humira pen or prefilled syringe from the refrigerator and allow it to sit at room temperature for 15-30 minutes.   Check the medication: The liquid should be clear and colorless. Do not use if its cloudy or contains particles.     Choosing an Injection Site:   Humira can be injected into the thigh or abdomen (avoid the 2-inch area around the belly button).   Rotate injection sites to avoid irritation. Do not inject into skin that is red, bruised, or tender.     Injecting Humira:  1. Clean the injection site with an alcohol swab and let it dry.  2. Administer the injection:   With the Humira Pen:   Hold the pen against your skin at a 90-degree angle.   Press the button and hold it down. You will hear a click, which signals the injection has started.   Keep holding the pen in place for 10 seconds until you hear a second click.   With the Humira Syringe:   Pinch the skin at the injection site.   Insert the needle at a 45-degree angle and slowly push the plunger down until all  the medication is injected.   Remove the needle and release the skin.  3. Dispose of the used pen or syringe in a sharps disposal container.     After the Injection:   If you notice a small amount of bleeding, gently press a cotton ball on the site.   Do not rub the area after injection.   Mild reactions like redness, swelling, or itching at the injection site are common and usually go away after a few days.     Storing Humira   Refrigerate Humira at 36°F to 46°F (2°C to 8°C). Do not freeze it.   If needed, Humira can be stored at room temperature (below 77°F/25°C) for up to 14 days. Once it has reached room temperature, it should not be refrigerated again.   Keep Humira away from heat and light.

## 2025-04-10 ENCOUNTER — RESULTS FOLLOW-UP (OUTPATIENT)
Dept: RHEUMATOLOGY | Facility: CLINIC | Age: 30
End: 2025-04-10
Payer: COMMERCIAL

## 2025-04-10 DIAGNOSIS — M45.7 ANKYLOSING SPONDYLITIS OF LUMBOSACRAL REGION: Primary | ICD-10-CM

## 2025-04-10 DIAGNOSIS — D84.821 DRUG-INDUCED IMMUNODEFICIENCY: ICD-10-CM

## 2025-04-10 DIAGNOSIS — Z15.89 HLA B27 POSITIVE: ICD-10-CM

## 2025-04-10 DIAGNOSIS — Z79.899 DRUG-INDUCED IMMUNODEFICIENCY: ICD-10-CM

## 2025-04-10 DIAGNOSIS — Z79.899 HIGH RISK MEDICATION USE: ICD-10-CM

## 2025-04-10 RX ORDER — ADALIMUMAB 40MG/0.4ML
40 KIT SUBCUTANEOUS
Qty: 2 PEN | Refills: 5 | Status: SHIPPED | OUTPATIENT
Start: 2025-04-10 | End: 2025-04-16 | Stop reason: CLARIF

## 2025-04-16 ENCOUNTER — PATIENT MESSAGE (OUTPATIENT)
Dept: RHEUMATOLOGY | Facility: CLINIC | Age: 30
End: 2025-04-16
Payer: COMMERCIAL

## 2025-04-16 ENCOUNTER — TELEPHONE (OUTPATIENT)
Dept: RHEUMATOLOGY | Facility: CLINIC | Age: 30
End: 2025-04-16
Payer: COMMERCIAL

## 2025-04-16 DIAGNOSIS — M45.7 ANKYLOSING SPONDYLITIS OF LUMBOSACRAL REGION: Primary | ICD-10-CM

## 2025-04-16 RX ORDER — ADALIMUMAB-ADAZ 40 MG/.4ML
40 INJECTION, SOLUTION SUBCUTANEOUS
Qty: 2 PEN | Refills: 5 | Status: SHIPPED | OUTPATIENT
Start: 2025-04-16

## 2025-04-24 ENCOUNTER — PATIENT MESSAGE (OUTPATIENT)
Dept: RHEUMATOLOGY | Facility: CLINIC | Age: 30
End: 2025-04-24
Payer: COMMERCIAL

## 2025-04-24 NOTE — TELEPHONE ENCOUNTER
Patient plans to come to [facility] on 4/25/2025 at 1300 for Humira administration education

## 2025-05-02 ENCOUNTER — PATIENT MESSAGE (OUTPATIENT)
Dept: RHEUMATOLOGY | Facility: CLINIC | Age: 30
End: 2025-05-02
Payer: COMMERCIAL

## 2025-05-06 ENCOUNTER — PATIENT MESSAGE (OUTPATIENT)
Dept: RHEUMATOLOGY | Facility: CLINIC | Age: 30
End: 2025-05-06
Payer: COMMERCIAL

## 2025-05-08 ENCOUNTER — PATIENT MESSAGE (OUTPATIENT)
Dept: ORTHOPEDICS | Facility: CLINIC | Age: 30
End: 2025-05-08
Payer: COMMERCIAL

## 2025-05-15 ENCOUNTER — PATIENT MESSAGE (OUTPATIENT)
Dept: RHEUMATOLOGY | Facility: CLINIC | Age: 30
End: 2025-05-15
Payer: COMMERCIAL

## 2025-05-22 ENCOUNTER — PATIENT MESSAGE (OUTPATIENT)
Dept: RHEUMATOLOGY | Facility: CLINIC | Age: 30
End: 2025-05-22
Payer: COMMERCIAL

## 2025-05-23 ENCOUNTER — LAB VISIT (OUTPATIENT)
Dept: LAB | Facility: HOSPITAL | Age: 30
End: 2025-05-23
Attending: NURSE PRACTITIONER
Payer: COMMERCIAL

## 2025-05-23 DIAGNOSIS — Z79.899 DRUG-INDUCED IMMUNODEFICIENCY: ICD-10-CM

## 2025-05-23 DIAGNOSIS — Z79.899 HIGH RISK MEDICATION USE: ICD-10-CM

## 2025-05-23 DIAGNOSIS — M45.7 ANKYLOSING SPONDYLITIS OF LUMBOSACRAL REGION: ICD-10-CM

## 2025-05-23 DIAGNOSIS — Z15.89 HLA B27 POSITIVE: ICD-10-CM

## 2025-05-23 DIAGNOSIS — D84.821 DRUG-INDUCED IMMUNODEFICIENCY: ICD-10-CM

## 2025-05-23 LAB
ALBUMIN SERPL-MCNC: 4.5 G/DL (ref 3.5–5)
ALBUMIN/GLOB SERPL: 1.1 RATIO (ref 1.1–2)
ALP SERPL-CCNC: 97 UNIT/L (ref 40–150)
ALT SERPL-CCNC: 94 UNIT/L (ref 0–55)
ANION GAP SERPL CALC-SCNC: 7 MEQ/L
AST SERPL-CCNC: 45 UNIT/L (ref 11–45)
BASOPHILS # BLD AUTO: 0.04 X10(3)/MCL
BASOPHILS NFR BLD AUTO: 0.5 %
BILIRUB SERPL-MCNC: 1.1 MG/DL
BUN SERPL-MCNC: 6.9 MG/DL (ref 8.9–20.6)
CALCIUM SERPL-MCNC: 10 MG/DL (ref 8.4–10.2)
CHLORIDE SERPL-SCNC: 106 MMOL/L (ref 98–107)
CO2 SERPL-SCNC: 25 MMOL/L (ref 22–29)
CREAT SERPL-MCNC: 0.73 MG/DL (ref 0.72–1.25)
CREAT/UREA NIT SERPL: 9
EOSINOPHIL # BLD AUTO: 0.14 X10(3)/MCL (ref 0–0.9)
EOSINOPHIL NFR BLD AUTO: 1.7 %
ERYTHROCYTE [DISTWIDTH] IN BLOOD BY AUTOMATED COUNT: 13.7 % (ref 11.5–17)
GFR SERPLBLD CREATININE-BSD FMLA CKD-EPI: >60 ML/MIN/1.73/M2
GLOBULIN SER-MCNC: 4.1 GM/DL (ref 2.4–3.5)
GLUCOSE SERPL-MCNC: 96 MG/DL (ref 74–100)
HCT VFR BLD AUTO: 49.2 % (ref 42–52)
HGB BLD-MCNC: 16.4 G/DL (ref 14–18)
IMM GRANULOCYTES # BLD AUTO: 0.02 X10(3)/MCL (ref 0–0.04)
IMM GRANULOCYTES NFR BLD AUTO: 0.2 %
LYMPHOCYTES # BLD AUTO: 3.37 X10(3)/MCL (ref 0.6–4.6)
LYMPHOCYTES NFR BLD AUTO: 41.8 %
MCH RBC QN AUTO: 27.3 PG (ref 27–31)
MCHC RBC AUTO-ENTMCNC: 33.3 G/DL (ref 33–36)
MCV RBC AUTO: 81.9 FL (ref 80–94)
MONOCYTES # BLD AUTO: 0.48 X10(3)/MCL (ref 0.1–1.3)
MONOCYTES NFR BLD AUTO: 6 %
NEUTROPHILS # BLD AUTO: 4.01 X10(3)/MCL (ref 2.1–9.2)
NEUTROPHILS NFR BLD AUTO: 49.8 %
NRBC BLD AUTO-RTO: 0 %
PLATELET # BLD AUTO: 239 X10(3)/MCL (ref 130–400)
PMV BLD AUTO: 12.2 FL (ref 7.4–10.4)
POTASSIUM SERPL-SCNC: 4.3 MMOL/L (ref 3.5–5.1)
PROT SERPL-MCNC: 8.6 GM/DL (ref 6.4–8.3)
RBC # BLD AUTO: 6.01 X10(6)/MCL (ref 4.7–6.1)
SODIUM SERPL-SCNC: 138 MMOL/L (ref 136–145)
WBC # BLD AUTO: 8.06 X10(3)/MCL (ref 4.5–11.5)

## 2025-05-23 PROCEDURE — 36415 COLL VENOUS BLD VENIPUNCTURE: CPT

## 2025-05-23 PROCEDURE — 80053 COMPREHEN METABOLIC PANEL: CPT

## 2025-05-23 PROCEDURE — 85025 COMPLETE CBC W/AUTO DIFF WBC: CPT

## 2025-05-27 ENCOUNTER — RESULTS FOLLOW-UP (OUTPATIENT)
Dept: RHEUMATOLOGY | Facility: CLINIC | Age: 30
End: 2025-05-27

## 2025-05-30 ENCOUNTER — EXTERNAL HOME HEALTH (OUTPATIENT)
Dept: HOME HEALTH SERVICES | Facility: HOSPITAL | Age: 30
End: 2025-05-30
Payer: COMMERCIAL

## 2025-06-02 ENCOUNTER — TELEPHONE (OUTPATIENT)
Dept: ORTHOPEDICS | Facility: CLINIC | Age: 30
End: 2025-06-02
Payer: COMMERCIAL

## 2025-06-14 ENCOUNTER — PATIENT MESSAGE (OUTPATIENT)
Dept: RHEUMATOLOGY | Facility: CLINIC | Age: 30
End: 2025-06-14
Payer: COMMERCIAL

## 2025-06-19 ENCOUNTER — TELEPHONE (OUTPATIENT)
Dept: RHEUMATOLOGY | Facility: CLINIC | Age: 30
End: 2025-06-19
Payer: COMMERCIAL

## 2025-06-19 DIAGNOSIS — Z79.899 HIGH RISK MEDICATION USE: ICD-10-CM

## 2025-06-19 DIAGNOSIS — Z15.89 HLA B27 POSITIVE: ICD-10-CM

## 2025-06-19 DIAGNOSIS — M45.7 ANKYLOSING SPONDYLITIS OF LUMBOSACRAL REGION: Primary | ICD-10-CM

## 2025-06-19 DIAGNOSIS — D84.821 DRUG-INDUCED IMMUNODEFICIENCY: ICD-10-CM

## 2025-06-19 DIAGNOSIS — Z79.899 DRUG-INDUCED IMMUNODEFICIENCY: ICD-10-CM

## 2025-07-01 ENCOUNTER — PATIENT MESSAGE (OUTPATIENT)
Dept: RHEUMATOLOGY | Facility: CLINIC | Age: 30
End: 2025-07-01
Payer: COMMERCIAL

## 2025-07-06 ENCOUNTER — PATIENT MESSAGE (OUTPATIENT)
Dept: RHEUMATOLOGY | Facility: CLINIC | Age: 30
End: 2025-07-06
Payer: COMMERCIAL

## 2025-07-08 ENCOUNTER — TELEPHONE (OUTPATIENT)
Dept: RHEUMATOLOGY | Facility: CLINIC | Age: 30
End: 2025-07-08
Payer: COMMERCIAL

## 2025-07-08 DIAGNOSIS — D84.821 DRUG-INDUCED IMMUNODEFICIENCY: ICD-10-CM

## 2025-07-08 DIAGNOSIS — M45.7 ANKYLOSING SPONDYLITIS OF LUMBOSACRAL REGION: Primary | ICD-10-CM

## 2025-07-08 DIAGNOSIS — Z15.89 HLA B27 POSITIVE: ICD-10-CM

## 2025-07-08 DIAGNOSIS — Z79.899 DRUG-INDUCED IMMUNODEFICIENCY: ICD-10-CM

## 2025-07-08 DIAGNOSIS — Z79.899 HIGH RISK MEDICATION USE: ICD-10-CM

## 2025-07-08 RX ORDER — ADALIMUMAB-ADAZ 40 MG/.4ML
40 INJECTION, SOLUTION SUBCUTANEOUS
Qty: 2 PEN | Refills: 1 | Status: SHIPPED | OUTPATIENT
Start: 2025-07-08 | End: 2025-07-11 | Stop reason: SDUPTHER

## 2025-07-08 NOTE — TELEPHONE ENCOUNTER
Patient Discussion Note  The patient has been on a Humira biosimilar for the past two months. He was able to fill one additional Humira prescription before losing insurance coverage. We switched him to Enbrel for affordability reasons, but he has not yet started Enbrel and planned to begin two weeks after his last Humira biosimilar injection. We were initially under the impression that he had been on Enbrel for the past month, which prompted the request for follow-up labs.    Due to recent developments--new insurance, a new location, and Humira biosimilar versus Enbrel--I advised the patient to delay starting Enbrel until he establishes care with a rheumatologist and uploads his new insurance information. My recommendation is to send a one-month supply of the Humira biosimilar and obtain a new PA with his new insurance until he establishes care with a rheumatologist. Alternatively, he could transition to Enbrel, as he already has a one-month supply. However, this option would require follow up labs before starting Enbrel. Please advise

## 2025-07-09 ENCOUNTER — PATIENT MESSAGE (OUTPATIENT)
Dept: RHEUMATOLOGY | Facility: CLINIC | Age: 30
End: 2025-07-09
Payer: COMMERCIAL

## 2025-07-09 DIAGNOSIS — Z15.89 HLA B27 POSITIVE: ICD-10-CM

## 2025-07-09 DIAGNOSIS — D84.821 DRUG-INDUCED IMMUNODEFICIENCY: ICD-10-CM

## 2025-07-09 DIAGNOSIS — Z79.899 HIGH RISK MEDICATION USE: ICD-10-CM

## 2025-07-09 DIAGNOSIS — Z79.899 DRUG-INDUCED IMMUNODEFICIENCY: ICD-10-CM

## 2025-07-09 DIAGNOSIS — M45.7 ANKYLOSING SPONDYLITIS OF LUMBOSACRAL REGION: ICD-10-CM

## 2025-07-10 ENCOUNTER — PATIENT MESSAGE (OUTPATIENT)
Dept: RHEUMATOLOGY | Facility: CLINIC | Age: 30
End: 2025-07-10
Payer: COMMERCIAL

## 2025-07-11 RX ORDER — ADALIMUMAB-ADAZ 40 MG/.4ML
40 INJECTION, SOLUTION SUBCUTANEOUS
Qty: 2 PEN | Refills: 1 | Status: SHIPPED | OUTPATIENT
Start: 2025-07-11

## 2025-07-29 ENCOUNTER — PATIENT MESSAGE (OUTPATIENT)
Dept: RHEUMATOLOGY | Facility: CLINIC | Age: 30
End: 2025-07-29
Payer: COMMERCIAL

## 2025-07-29 ENCOUNTER — PATIENT MESSAGE (OUTPATIENT)
Dept: ORTHOPEDICS | Facility: CLINIC | Age: 30
End: 2025-07-29
Payer: COMMERCIAL

## (undated) DEVICE — BANDAGE ADHESIVE FABRIC 2X4

## (undated) DEVICE — GLOVE SENSICARE PI ORTHO LT 8

## (undated) DEVICE — NDL SAFETY 25G X 1.5 ECLIPSE

## (undated) DEVICE — KIT SURGICAL TURNOVER

## (undated) DEVICE — GLOVE SIGNATURE ESSNTL LTX 8.5

## (undated) DEVICE — SOL NACL IRR 1000ML BTL

## (undated) DEVICE — KIT TRACKING VIZADISC HIP

## (undated) DEVICE — NDL BLUNT FILL 18G 1IN

## (undated) DEVICE — SYR 10CC LUER LOCK

## (undated) DEVICE — PENCIL ELECSURG ROCKER 15FT

## (undated) DEVICE — KIT TOTAL HIP HLGC

## (undated) DEVICE — DRAPE SURG W/TWL 17 5/8X23

## (undated) DEVICE — SOL NORMAL USPCA 0.9%

## (undated) DEVICE — DRAPE MEDIUM SHEET 40X70IN

## (undated) DEVICE — BEADED CABLE AND SLEEVE SET
Type: IMPLANTABLE DEVICE | Site: HIP | Status: NON-FUNCTIONAL
Brand: DALL-MILES
Removed: 2025-03-12

## (undated) DEVICE — CONTRAST ISOVUE 300 50ML

## (undated) DEVICE — PIN BONE 4 X 140MM STERILE
Type: IMPLANTABLE DEVICE | Site: HIP | Status: NON-FUNCTIONAL
Removed: 2025-03-12

## (undated) DEVICE — CLOSURE SKIN STERI STRIP 1/2X4

## (undated) DEVICE — GOWN POLY REINF X-LONG 2XL

## (undated) DEVICE — SUT BLU BR 2 TAPERD NDL 1/2

## (undated) DEVICE — COVER HD BACK TABLE 6FT

## (undated) DEVICE — GLOVE SENSICARE PI GRN 7

## (undated) DEVICE — DRAPE STERI U-SHAPED 47X51IN

## (undated) DEVICE — TOWEL OR DISP STRL BLUE 4/PK

## (undated) DEVICE — SUT MCRYL PLUS 2-0 CT-1 36IN

## (undated) DEVICE — SUT MONOCRYL 3-0 PS-2 UND

## (undated) DEVICE — RETRIEVER SUTURE HEWSON DISP

## (undated) DEVICE — CONTAINER SPECIMEN SCREW 4OZ

## (undated) DEVICE — PAD ABDOMINAL STERILE 8X10IN

## (undated) DEVICE — NDL ANES SPINAL 18X3.5ST 18G

## (undated) DEVICE — STRAP POS KNEE BODY 4X60IN

## (undated) DEVICE — SYR 30CC LUER LOCK

## (undated) DEVICE — SOL POVIDONE IODINE PCH 3/4OZ

## (undated) DEVICE — APPLICATOR CHLORAPREP ORN 26ML

## (undated) DEVICE — NDL SPINAL 22GA 3.5 IN QUINCKE

## (undated) DEVICE — DRESSING TRANS 4X4 TEGADERM

## (undated) DEVICE — BLADE SAG DUAL CUT 18X90X1.35

## (undated) DEVICE — DRAPE FULL SHEET 70X100IN

## (undated) DEVICE — GLOVE SENSICARE PI MICRO 6.5

## (undated) DEVICE — SPONGE COTTON TRAY 4X4IN

## (undated) DEVICE — SUT STRATAFIX 3-0 30CM

## (undated) DEVICE — DRAPE INCISE IOBAN 2 23X23IN

## (undated) DEVICE — ELECTRODE PATIENT RETURN DISP

## (undated) DEVICE — TAPE ADH MEDIPORE 4 X 10YDS

## (undated) DEVICE — KIT DRAPE RIO ONE PIECE W/POCK

## (undated) DEVICE — GLOVE SENSICARE PI GRN 8.5

## (undated) DEVICE — KIT CHECKPOINT TIBIAL

## (undated) DEVICE — SYR DISP LL 5CC

## (undated) DEVICE — DEVICE STRATAFIX SYMMETRIC +